# Patient Record
Sex: FEMALE | Race: WHITE | NOT HISPANIC OR LATINO | Employment: UNEMPLOYED | URBAN - METROPOLITAN AREA
[De-identification: names, ages, dates, MRNs, and addresses within clinical notes are randomized per-mention and may not be internally consistent; named-entity substitution may affect disease eponyms.]

---

## 2017-03-24 ENCOUNTER — HOSPITAL ENCOUNTER (EMERGENCY)
Facility: MEDICAL CENTER | Age: 42
End: 2017-03-24
Attending: EMERGENCY MEDICINE
Payer: COMMERCIAL

## 2017-03-24 VITALS
SYSTOLIC BLOOD PRESSURE: 138 MMHG | TEMPERATURE: 97 F | BODY MASS INDEX: 47.09 KG/M2 | DIASTOLIC BLOOD PRESSURE: 80 MMHG | WEIGHT: 293 LBS | HEART RATE: 64 BPM | HEIGHT: 66 IN | OXYGEN SATURATION: 96 % | RESPIRATION RATE: 18 BRPM

## 2017-03-24 DIAGNOSIS — M79.604 PAIN OF RIGHT LOWER EXTREMITY: ICD-10-CM

## 2017-03-24 LAB
ALBUMIN SERPL BCP-MCNC: 4.1 G/DL (ref 3.2–4.9)
ALBUMIN/GLOB SERPL: 1.4 G/DL
ALP SERPL-CCNC: 90 U/L (ref 30–99)
ALT SERPL-CCNC: 126 U/L (ref 2–50)
ANION GAP SERPL CALC-SCNC: 9 MMOL/L (ref 0–11.9)
APTT PPP: 28.4 SEC (ref 24.7–36)
AST SERPL-CCNC: 38 U/L (ref 12–45)
BASOPHILS # BLD AUTO: 0.7 % (ref 0–1.8)
BASOPHILS # BLD: 0.06 K/UL (ref 0–0.12)
BILIRUB SERPL-MCNC: 0.4 MG/DL (ref 0.1–1.5)
BUN SERPL-MCNC: 11 MG/DL (ref 8–22)
CALCIUM SERPL-MCNC: 9.3 MG/DL (ref 8.5–10.5)
CHLORIDE SERPL-SCNC: 104 MMOL/L (ref 96–112)
CO2 SERPL-SCNC: 23 MMOL/L (ref 20–33)
CREAT SERPL-MCNC: 0.73 MG/DL (ref 0.5–1.4)
EOSINOPHIL # BLD AUTO: 0.21 K/UL (ref 0–0.51)
EOSINOPHIL NFR BLD: 2.5 % (ref 0–6.9)
ERYTHROCYTE [DISTWIDTH] IN BLOOD BY AUTOMATED COUNT: 41.1 FL (ref 35.9–50)
ERYTHROCYTE [SEDIMENTATION RATE] IN BLOOD BY WESTERGREN METHOD: 24 MM/HOUR (ref 0–20)
GFR SERPL CREATININE-BSD FRML MDRD: >60 ML/MIN/1.73 M 2
GLOBULIN SER CALC-MCNC: 3 G/DL (ref 1.9–3.5)
GLUCOSE SERPL-MCNC: 91 MG/DL (ref 65–99)
HCT VFR BLD AUTO: 41.1 % (ref 37–47)
HGB BLD-MCNC: 13.7 G/DL (ref 12–16)
IMM GRANULOCYTES # BLD AUTO: 0.03 K/UL (ref 0–0.11)
IMM GRANULOCYTES NFR BLD AUTO: 0.4 % (ref 0–0.9)
INR PPP: 0.99 (ref 0.87–1.13)
LYMPHOCYTES # BLD AUTO: 2.68 K/UL (ref 1–4.8)
LYMPHOCYTES NFR BLD: 32.5 % (ref 22–41)
MCH RBC QN AUTO: 29.1 PG (ref 27–33)
MCHC RBC AUTO-ENTMCNC: 33.3 G/DL (ref 33.6–35)
MCV RBC AUTO: 87.3 FL (ref 81.4–97.8)
MONOCYTES # BLD AUTO: 0.52 K/UL (ref 0–0.85)
MONOCYTES NFR BLD AUTO: 6.3 % (ref 0–13.4)
NEUTROPHILS # BLD AUTO: 4.75 K/UL (ref 2–7.15)
NEUTROPHILS NFR BLD: 57.6 % (ref 44–72)
NRBC # BLD AUTO: 0 K/UL
NRBC BLD AUTO-RTO: 0 /100 WBC
PLATELET # BLD AUTO: 276 K/UL (ref 164–446)
PMV BLD AUTO: 10.4 FL (ref 9–12.9)
POTASSIUM SERPL-SCNC: 3.8 MMOL/L (ref 3.6–5.5)
PROT SERPL-MCNC: 7.1 G/DL (ref 6–8.2)
PROTHROMBIN TIME: 13.4 SEC (ref 12–14.6)
RBC # BLD AUTO: 4.71 M/UL (ref 4.2–5.4)
SODIUM SERPL-SCNC: 136 MMOL/L (ref 135–145)
WBC # BLD AUTO: 8.3 K/UL (ref 4.8–10.8)

## 2017-03-24 PROCEDURE — 85610 PROTHROMBIN TIME: CPT

## 2017-03-24 PROCEDURE — 99283 EMERGENCY DEPT VISIT LOW MDM: CPT

## 2017-03-24 PROCEDURE — 80053 COMPREHEN METABOLIC PANEL: CPT

## 2017-03-24 PROCEDURE — 93971 EXTREMITY STUDY: CPT

## 2017-03-24 PROCEDURE — 85025 COMPLETE CBC W/AUTO DIFF WBC: CPT

## 2017-03-24 PROCEDURE — 85652 RBC SED RATE AUTOMATED: CPT

## 2017-03-24 PROCEDURE — 85730 THROMBOPLASTIN TIME PARTIAL: CPT

## 2017-03-24 ASSESSMENT — PAIN SCALES - GENERAL: PAINLEVEL_OUTOF10: 7

## 2017-03-24 NOTE — ED AVS SNAPSHOT
Home Care Instructions                                                                                                                Jose Moreira   MRN: [id number]    Department:  [facility], Emergency Dept   Date of Visit:  3/24/2017            [facility], Emergency Dept    [address]    [place] [address]    Phone:  [phone]      You were seen by     Andrew Mckeon M.D.      Your Diagnosis Was     Pain of right lower extremity     M79.604       Follow-up Information     1. Follow up with JACQUELINE Borjas In 2 weeks.    Specialty:  Family Medicine    Why:  for repeat ultrasound if symptoms continue    Contact information    [address] [facility] [address]  [place] [address]  [phone]        Medication Information     Review all of your home medications and newly ordered medications with your primary doctor and/or pharmacist as soon as possible. Follow medication instructions as directed by your doctor and/or pharmacist.     Please keep your complete medication list with you and share with your physician. Update the information when medications are discontinued, doses are changed, or new medications (including over-the-counter products) are added; and carry medication information at all times in the event of emergency situations.               Medication List      ASK your doctor about these medications        Instructions    Morning Afternoon Evening Bedtime    carisoprodol 350 MG Tabs   Commonly known as:  SOMA        Take 1 Tab by mouth every 8 hours.   Dose:  350 mg                        gabapentin 300 MG Caps   Commonly known as:  NEURONTIN        Take 300 mg by mouth 3 times a day as needed.   Dose:  300 mg                        levothyroxine 25 MCG Tabs   Commonly known as:  SYNTHROID        Take 1 Tab by mouth Every morning on an empty stomach.   Dose:  25 mcg                        methocarbamol 500 MG Tabs   Commonly known as:  ROBAXIN        Take 500 mg  by mouth 3 times a day.   Dose:  500 mg                        tramadol 50 MG Tabs   Commonly known as:  ULTRAM        Take 50 mg by mouth 1 time daily as needed for Moderate Pain.   Dose:  50 mg                                Procedures and tests performed during your visit     APTT    CBC WITH DIFFERENTIAL    COMP METABOLIC PANEL    ESTIMATED GFR    LE VENOUS DUPLEX    PROTHROMBIN TIME    WESTERGREN SED RATE        Discharge Instructions       Musculoskeletal Pain  Musculoskeletal pain is muscle and nora aches and pains. These pains can occur in any part of the body. Your caregiver may treat you without knowing the cause of the pain. They may treat you if blood or urine tests, X-rays, and other tests were normal.   CAUSES  There is often not a definite cause or reason for these pains. These pains may be caused by a type of germ (virus). The discomfort may also come from overuse. Overuse includes working out too hard when your body is not fit. Nora aches also come from weather changes. Bone is sensitive to atmospheric pressure changes.  HOME CARE INSTRUCTIONS   · Ask when your test results will be ready. Make sure you get your test results.  · Only take over-the-counter or prescription medicines for pain, discomfort, or fever as directed by your caregiver. If you were given medications for your condition, do not drive, operate machinery or power tools, or sign legal documents for 24 hours. Do not drink alcohol. Do not take sleeping pills or other medications that may interfere with treatment.  · Continue all activities unless the activities cause more pain. When the pain lessens, slowly resume normal activities. Gradually increase the intensity and duration of the activities or exercise.  · During periods of severe pain, bed rest may be helpful. Lay or sit in any position that is comfortable.  · Putting ice on the injured area.  ¨ Put ice in a bag.  ¨ Place a towel between your skin and the bag.  ¨ Leave the ice  on for 15 to 20 minutes, 3 to 4 times a day.  · Follow up with your caregiver for continued problems and no reason can be found for the pain. If the pain becomes worse or does not go away, it may be necessary to repeat tests or do additional testing. Your caregiver may need to look further for a possible cause.  SEEK IMMEDIATE MEDICAL CARE IF:  · You have pain that is getting worse and is not relieved by medications.  · You develop chest pain that is associated with shortness or breath, sweating, feeling sick to your stomach (nauseous), or throw up (vomit).  · Your pain becomes localized to the abdomen.  · You develop any new symptoms that seem different or that concern you.  MAKE SURE YOU:   · Understand these instructions.  · Will watch your condition.  · Will get help right away if you are not doing well or get worse.     This information is not intended to replace advice given to you by your health care provider. Make sure you discuss any questions you have with your health care provider.     Document Released: 12/18/2006 Document Revised: 03/11/2013 Document Reviewed: 08/22/2014  [employer] Interactive Patient Education ©2016 [employer]            Patient Information     Patient Information    Following emergency treatment: all patient requiring follow-up care must return either to a private physician or a clinic if your condition worsens before you are able to obtain further medical attention, please return to the emergency room.     Billing Information    At [facility], we work to make the billing process streamlined for our patients.  Our Representatives are here to answer any questions you may have regarding your hospital bill.  If you have insurance coverage and have supplied your insurance information to us, we will submit a claim to your insurer on your behalf.  Should you have any questions regarding your bill, we can be reached online or by phone as follows:  Online: You are able pay your bills  online or live chat with our representatives about any billing questions you may have. We are here to help Monday - Friday from 8:00am to 7:30pm and 9:00am - 12:00pm on Saturdays.  Please visit [email or web address]  for more information.   Phone:  [phone] or [phone]    Please note that your emergency physician, surgeon, pathologist, radiologist, anesthesiologist, and other specialists are not employed by [facility] and will therefore bill separately for their services.  Please contact them directly for any questions concerning their bills at the numbers below:     Emergency Physician Services:  [phone]  [facility]:  [phone]  [facility]:  [phone]  [facility]:  [phone]    1. Your final bill may vary from the amount quoted upon discharge if all procedures are not complete at that time, or if your doctor has additional procedures of which we are not aware. You will receive an additional bill if you return to the Emergency Department at [facility] for suture removal regardless of the facility of which the sutures were placed.     2. Please arrange for settlement of this account at the emergency registration.    3. All self-pay accounts are due in full at the time of treatment.  If you are unable to meet this obligation then payment is expected within 4-5 days.     4. If you have had radiology studies (CT, X-ray, Ultrasound, MRI), you have received a preliminary result during your emergency department visit. Please contact the radiology department [phone] to receive a copy of your final result. Please discuss the Final result with your primary physician or with the follow up physician provided.     Crisis Hotline:  National Crisis Hotline:  6-962-IZARPJW or 1-466.582.9790  [place] Crisis Hotline:    1-179.770.6687 or 172-244-8826         ED Discharge Follow Up Questions    1. In order to provide you  with very good care, we would like to follow up with a phone call in the next few days.  May we have your permission to contact you?     YES /  NO    2. What is the best phone number to call you? (       )_____-__________    3. What is the best time to call you?      Morning  /  Afternoon  /  Evening                   Patient Signature:  ____________________________________________________________    Date:  ____________________________________________________________

## 2017-03-24 NOTE — ED AVS SNAPSHOT
[employer] Access Code: [email or web address]  Expires: 4/3/2017 11:06 AM    K[employer]  A secure, online tool to manage your health information     [employer]’s Kratos Technology® is a secure, online tool that connects you to your personalized health information from the privacy of your home -- day or night - making it very easy for you to manage your healthcare. Once the activation process is completed, you can even access your medical information using the K[employer] unique, which is available for free in the Apple Unique store or Google Play store.     [employer] provides the following levels of access (as shown below):   My Chart Features   [facility] Primary Care Doctor [facility]  Specialists [facility]  Urgent  Care Non-[facility]  Primary Care  Doctor   Email your healthcare team securely and privately 24/7 X X X X   Manage appointments: schedule your next appointment; view details of past/upcoming appointments X      Request prescription refills. X      View recent personal medical records, including lab and immunizations X X X X   View health record, including health history, allergies, medications X X X X   Read reports about your outpatient visits, procedures, consult and ER notes X X X X   See your discharge summary, which is a recap of your hospital and/or ER visit that includes your diagnosis, lab results, and care plan. X X       How to register for [employer]:  1. Go to  [email or web address].  2. Click on the Sign Up Now box, which takes you to the New Member Sign Up page. You will need to provide the following information:  a. Enter your [employer] Access Code exactly as it appears at the top of this page. (You will not need to use this code after you’ve completed the sign-up process. If you do not sign up before the expiration date, you must request a new code.)   b. Enter your date of birth.   c. Enter your home email address.   d. Click Submit, and follow the next screen’s instructions.  3. Create a K[employer] ID. This will be your [employer]  login ID and cannot be changed, so think of one that is secure and easy to remember.  4. Create a [email or web address] password. You can change your password at any time.  5. Enter your Password Reset Question and Answer. This can be used at a later time if you forget your password.   6. Enter your e-mail address. This allows you to receive e-mail notifications when new information is available in [email or web address].  7. Click Sign Up. You can now view your health information.    For assistance activating your [email or web address] account, call [phone]

## 2017-03-24 NOTE — ED NOTES
".  Chief Complaint   Patient presents with   • Leg Pain     Worsening right leg pain, denies swelling, denies redness, \"I think I might have a blood clot in the back of my knee\"     ./80 mmHg  Pulse 69  Temp(Src) 36.1 °C (97 °F) (Temporal)  Resp 18  Ht 1.676 m (5' 6\")  Wt 147.5 kg (325 lb 2.9 oz)  BMI 52.51 kg/m2  SpO2 98%    Ambulatory to triage with above complaints, states she was here last August with blod clot, no swelling or redness noted to leg, patient denies taking blood thinners  "

## 2017-03-24 NOTE — ED AVS SNAPSHOT
3/24/2017          Jose Moreira  [address]  [facility] [address]    Dear Jose:    [facility] wants to ensure your discharge home is safe and you or your loved ones have had all your questions answered regarding your care after you leave the hospital.    You may receive a telephone call within two days of your discharge.  This call is to make certain you understand your discharge instructions as well as ensure we provided you with the best care possible during your stay with us.     The call will only last approximately 3-5 minutes and will be done by a nurse.    Once again, we want to ensure your discharge home is safe and that you have a clear understanding of any next steps in your care.  If you have any questions or concerns, please do not hesitate to contact us, we are here for you.  Thank you for choosing [facility] for your healthcare needs.    Sincerely,    Ozzie Ralph    [facility]

## 2017-03-25 NOTE — ED PROVIDER NOTES
"ED Provider Note    CHIEF COMPLAINT  Chief Complaint   Patient presents with   • Leg Pain     Worsening right leg pain, denies swelling, denies redness, \"I think I might have a blood clot in the back of my knee\"; pain on and off x 7 months, worsening       HPI  Jose Moreira is a 41 y.o. female who presents for evaluation of pain in the posterior right calf, mild swelling. The patient denies any recent trauma. She does not report pain inside the knee and denies any hyperextension twisting or direct knee injury. She specifically denies any chest pain or shortness of breath. No pleuritic discomfort. She is worried that she might have a blood clot. He does have an extensive family history of blood clots in her mother by report. She's never had a blood clot herself before. Denies any other symptoms. No high fevers or chills    REVIEW OF SYSTEMS  See HPI for further details. No numbness weakness or tingling All other systems are negative.     PAST MEDICAL HISTORY  Past Medical History   Diagnosis Date   • Muscle disorder      c1, c2, nerve pain       FAMILY HISTORY  Noncontributory    SOCIAL HISTORY  Social History     Social History   • Marital Status:      Spouse Name: N/A   • Number of Children: N/A   • Years of Education: N/A     Social History Main Topics   • Smoking status: Former Smoker -- 17 years     Types: Cigarettes     Quit date: 07/27/2012   • Smokeless tobacco: None   • Alcohol Use: 0.6 oz/week     1 Cans of beer per week      Comment: RARE   • Drug Use: No   • Sexual Activity: Not Asked     Other Topics Concern   • None     Social History Narrative     Former smoker no IV drugs denies pregnancy  SURGICAL HISTORY  History reviewed. No pertinent past surgical history.  Gallbladder  CURRENT MEDICATIONS  No current facility-administered medications for this encounter.    Current outpatient prescriptions:   •  carisoprodol (SOMA) 350 MG Tab, Take 1 Tab by mouth every 8 hours., Disp: 30 Tab, Rfl: 0  •  " "levothyroxine (SYNTHROID) 25 MCG Tab, Take 1 Tab by mouth Every morning on an empty stomach., Disp: 30 Tab, Rfl: 1  •  tramadol (ULTRAM) 50 MG Tab, Take 50 mg by mouth 1 time daily as needed for Moderate Pain., Disp: , Rfl:   •  gabapentin (NEURONTIN) 300 MG Cap, Take 300 mg by mouth 3 times a day as needed., Disp: , Rfl:   •  methocarbamol (ROBAXIN) 500 MG Tab, Take 500 mg by mouth 3 times a day., Disp: , Rfl:       ALLERGIES  Allergies   Allergen Reactions   • Morphine Itching       PHYSICAL EXAM  VITAL SIGNS: /80 mmHg  Pulse 64  Temp(Src) 36.1 °C (97 °F) (Temporal)  Resp 18  Ht 1.676 m (5' 6\")  Wt 147.5 kg (325 lb 2.9 oz)  BMI 52.51 kg/m2  SpO2 98%  LMP 03/20/2017 (Exact Date)      Constitutional: Well developed, Well nourished, No acute distress, Non-toxic appearance.   HENT: Normocephalic, Atraumatic, Bilateral external ears normal, Oropharynx moist, No oral exudates, Nose normal.   Eyes: PERRLA, EOMI, Conjunctiva normal, No discharge.   Neck: Normal range of motion, No tenderness, Supple, No stridor.   Cardiovascular: Normal heart rate, Normal rhythm, No murmurs, No rubs, No gallops.   Thorax & Lungs: Normal breath sounds, No respiratory distress, No wheezing, No chest tenderness.   Abdomen: Bowel sounds normal, Soft, No tenderness, No masses, No pulsatile masses.   Skin: Warm, Dry, No erythema, No rash.   Extremities: Swelling noted in the right lower extremity positive Homans sign distal neurovascular exam is normal no erythema no warmth noted joint effusion no bony tenderness   Neurologic: Alert & oriented x 3, Normal motor function, Normal sensory function, No focal deficits noted.   Psychiatric: Affect normal, Judgment normal, Mood normal.       RADIOLOGY/PROCEDURES  LE VENOUS DUPLEX   Final Result       ultrasound of the right lower extremity is negative for DVT    Results for orders placed or performed during the hospital encounter of 03/24/17   CBC WITH DIFFERENTIAL   Result Value Ref " Range    WBC 8.3 4.8 - 10.8 K/uL    RBC 4.71 4.20 - 5.40 M/uL    Hemoglobin 13.7 12.0 - 16.0 g/dL    Hematocrit 41.1 37.0 - 47.0 %    MCV 87.3 81.4 - 97.8 fL    MCH 29.1 27.0 - 33.0 pg    MCHC 33.3 (L) 33.6 - 35.0 g/dL    RDW 41.1 35.9 - 50.0 fL    Platelet Count 276 164 - 446 K/uL    MPV 10.4 9.0 - 12.9 fL    Neutrophils-Polys 57.60 44.00 - 72.00 %    Lymphocytes 32.50 22.00 - 41.00 %    Monocytes 6.30 0.00 - 13.40 %    Eosinophils 2.50 0.00 - 6.90 %    Basophils 0.70 0.00 - 1.80 %    Immature Granulocytes 0.40 0.00 - 0.90 %    Nucleated RBC 0.00 /100 WBC    Neutrophils (Absolute) 4.75 2.00 - 7.15 K/uL    Lymphs (Absolute) 2.68 1.00 - 4.80 K/uL    Monos (Absolute) 0.52 0.00 - 0.85 K/uL    Eos (Absolute) 0.21 0.00 - 0.51 K/uL    Baso (Absolute) 0.06 0.00 - 0.12 K/uL    Immature Granulocytes (abs) 0.03 0.00 - 0.11 K/uL    NRBC (Absolute) 0.00 K/uL   COMP METABOLIC PANEL   Result Value Ref Range    Sodium 136 135 - 145 mmol/L    Potassium 3.8 3.6 - 5.5 mmol/L    Chloride 104 96 - 112 mmol/L    Co2 23 20 - 33 mmol/L    Anion Gap 9.0 0.0 - 11.9    Glucose 91 65 - 99 mg/dL    Bun 11 8 - 22 mg/dL    Creatinine 0.73 0.50 - 1.40 mg/dL    Calcium 9.3 8.5 - 10.5 mg/dL    AST(SGOT) 38 12 - 45 U/L    ALT(SGPT) 126 (H) 2 - 50 U/L    Alkaline Phosphatase 90 30 - 99 U/L    Total Bilirubin 0.4 0.1 - 1.5 mg/dL    Albumin 4.1 3.2 - 4.9 g/dL    Total Protein 7.1 6.0 - 8.2 g/dL    Globulin 3.0 1.9 - 3.5 g/dL    A-G Ratio 1.4 g/dL   PROTHROMBIN TIME   Result Value Ref Range    PT 13.4 12.0 - 14.6 sec    INR 0.99 0.87 - 1.13   APTT   Result Value Ref Range    APTT 28.4 24.7 - 36.0 sec   ESTIMATED GFR   Result Value Ref Range    GFR If African American >60 >60 mL/min/1.73 m 2    GFR If Non African American >60 >60 mL/min/1.73 m 2      COURSE & MEDICAL DECISION MAKING  Pertinent Labs & Imaging studies reviewed. (See chart for details)  The patient has no evidence of deep vein thrombosis in left lites are normal. The patient did not want to  wait for the sed rate which I think is reasonable. I recommended that she rest ice and elevate her right leg. If she develops persistent pain and swelling she will need a repeat ultrasound in 10-14 days    FINAL IMPRESSION  1. Right lower extremity pain         Electronically signed by: Andrew Mckeon, 3/24/2017 6:08 PM

## 2017-03-25 NOTE — ED NOTES
"Pt placed in gown, VS obtained  Chief Complaint   Patient presents with   • Leg Pain     Worsening right leg pain, denies swelling, denies redness, \"I think I might have a blood clot in the back of my knee\"; pain on and off x 7 months, worsening       "

## 2017-03-25 NOTE — DISCHARGE INSTRUCTIONS
Musculoskeletal Pain  Musculoskeletal pain is muscle and nora aches and pains. These pains can occur in any part of the body. Your caregiver may treat you without knowing the cause of the pain. They may treat you if blood or urine tests, X-rays, and other tests were normal.   CAUSES  There is often not a definite cause or reason for these pains. These pains may be caused by a type of germ (virus). The discomfort may also come from overuse. Overuse includes working out too hard when your body is not fit. Nora aches also come from weather changes. Bone is sensitive to atmospheric pressure changes.  HOME CARE INSTRUCTIONS   · Ask when your test results will be ready. Make sure you get your test results.  · Only take over-the-counter or prescription medicines for pain, discomfort, or fever as directed by your caregiver. If you were given medications for your condition, do not drive, operate machinery or power tools, or sign legal documents for 24 hours. Do not drink alcohol. Do not take sleeping pills or other medications that may interfere with treatment.  · Continue all activities unless the activities cause more pain. When the pain lessens, slowly resume normal activities. Gradually increase the intensity and duration of the activities or exercise.  · During periods of severe pain, bed rest may be helpful. Lay or sit in any position that is comfortable.  · Putting ice on the injured area.  ¨ Put ice in a bag.  ¨ Place a towel between your skin and the bag.  ¨ Leave the ice on for 15 to 20 minutes, 3 to 4 times a day.  · Follow up with your caregiver for continued problems and no reason can be found for the pain. If the pain becomes worse or does not go away, it may be necessary to repeat tests or do additional testing. Your caregiver may need to look further for a possible cause.  SEEK IMMEDIATE MEDICAL CARE IF:  · You have pain that is getting worse and is not relieved by medications.  · You develop chest pain  that is associated with shortness or breath, sweating, feeling sick to your stomach (nauseous), or throw up (vomit).  · Your pain becomes localized to the abdomen.  · You develop any new symptoms that seem different or that concern you.  MAKE SURE YOU:   · Understand these instructions.  · Will watch your condition.  · Will get help right away if you are not doing well or get worse.     This information is not intended to replace advice given to you by your health care provider. Make sure you discuss any questions you have with your health care provider.     Document Released: 12/18/2006 Document Revised: 03/11/2013 Document Reviewed: 08/22/2014  [employer] Interactive Patient Education ©2016 [employer]

## 2017-03-25 NOTE — ED NOTES
Waiting for labs results to further determine POC; no distress noted;  at bedside, bed low, call light within reach

## 2018-12-03 ENCOUNTER — TELEPHONE (OUTPATIENT)
Dept: SCHEDULING | Facility: IMAGING CENTER | Age: 43
End: 2018-12-03

## 2020-02-15 ENCOUNTER — TELEPHONE (OUTPATIENT)
Dept: SCHEDULING | Facility: IMAGING CENTER | Age: 45
End: 2020-02-15

## 2021-01-19 ENCOUNTER — HOSPITAL ENCOUNTER (EMERGENCY)
Facility: MEDICAL CENTER | Age: 46
End: 2021-01-19
Attending: EMERGENCY MEDICINE
Payer: COMMERCIAL

## 2021-01-19 ENCOUNTER — APPOINTMENT (OUTPATIENT)
Dept: RADIOLOGY | Facility: MEDICAL CENTER | Age: 46
End: 2021-01-19
Attending: EMERGENCY MEDICINE
Payer: COMMERCIAL

## 2021-01-19 VITALS
SYSTOLIC BLOOD PRESSURE: 157 MMHG | BODY MASS INDEX: 46.61 KG/M2 | RESPIRATION RATE: 16 BRPM | HEIGHT: 66 IN | DIASTOLIC BLOOD PRESSURE: 74 MMHG | HEART RATE: 89 BPM | WEIGHT: 290 LBS | TEMPERATURE: 98.3 F | OXYGEN SATURATION: 98 %

## 2021-01-19 DIAGNOSIS — M25.561 ACUTE PAIN OF RIGHT KNEE: ICD-10-CM

## 2021-01-19 LAB
ALBUMIN SERPL BCP-MCNC: 4 G/DL (ref 3.2–4.9)
ALBUMIN/GLOB SERPL: 1.5 G/DL
ALP SERPL-CCNC: 152 U/L (ref 30–99)
ALT SERPL-CCNC: 414 U/L (ref 2–50)
ANION GAP SERPL CALC-SCNC: 9 MMOL/L (ref 7–16)
AST SERPL-CCNC: 605 U/L (ref 12–45)
BASOPHILS # BLD AUTO: 0.5 % (ref 0–1.8)
BASOPHILS # BLD: 0.04 K/UL (ref 0–0.12)
BILIRUB SERPL-MCNC: 0.3 MG/DL (ref 0.1–1.5)
BUN SERPL-MCNC: 15 MG/DL (ref 8–22)
CALCIUM SERPL-MCNC: 9.2 MG/DL (ref 8.5–10.5)
CHLORIDE SERPL-SCNC: 107 MMOL/L (ref 96–112)
CO2 SERPL-SCNC: 24 MMOL/L (ref 20–33)
CREAT SERPL-MCNC: 0.71 MG/DL (ref 0.5–1.4)
EOSINOPHIL # BLD AUTO: 0.11 K/UL (ref 0–0.51)
EOSINOPHIL NFR BLD: 1.5 % (ref 0–6.9)
ERYTHROCYTE [DISTWIDTH] IN BLOOD BY AUTOMATED COUNT: 41.7 FL (ref 35.9–50)
GLOBULIN SER CALC-MCNC: 2.7 G/DL (ref 1.9–3.5)
GLUCOSE SERPL-MCNC: 108 MG/DL (ref 65–99)
HCT VFR BLD AUTO: 40.1 % (ref 37–47)
HGB BLD-MCNC: 12.9 G/DL (ref 12–16)
IMM GRANULOCYTES # BLD AUTO: 0.02 K/UL (ref 0–0.11)
IMM GRANULOCYTES NFR BLD AUTO: 0.3 % (ref 0–0.9)
LYMPHOCYTES # BLD AUTO: 2.14 K/UL (ref 1–4.8)
LYMPHOCYTES NFR BLD: 29.3 % (ref 22–41)
MCH RBC QN AUTO: 28 PG (ref 27–33)
MCHC RBC AUTO-ENTMCNC: 32.2 G/DL (ref 33.6–35)
MCV RBC AUTO: 87.2 FL (ref 81.4–97.8)
MONOCYTES # BLD AUTO: 0.69 K/UL (ref 0–0.85)
MONOCYTES NFR BLD AUTO: 9.4 % (ref 0–13.4)
NEUTROPHILS # BLD AUTO: 4.31 K/UL (ref 2–7.15)
NEUTROPHILS NFR BLD: 59 % (ref 44–72)
NRBC # BLD AUTO: 0 K/UL
NRBC BLD-RTO: 0 /100 WBC
PLATELET # BLD AUTO: 249 K/UL (ref 164–446)
PMV BLD AUTO: 10.9 FL (ref 9–12.9)
POTASSIUM SERPL-SCNC: 4 MMOL/L (ref 3.6–5.5)
PROT SERPL-MCNC: 6.7 G/DL (ref 6–8.2)
RBC # BLD AUTO: 4.6 M/UL (ref 4.2–5.4)
SODIUM SERPL-SCNC: 140 MMOL/L (ref 135–145)
WBC # BLD AUTO: 7.3 K/UL (ref 4.8–10.8)

## 2021-01-19 PROCEDURE — 96374 THER/PROPH/DIAG INJ IV PUSH: CPT | Mod: EDC

## 2021-01-19 PROCEDURE — 99284 EMERGENCY DEPT VISIT MOD MDM: CPT | Mod: EDC

## 2021-01-19 PROCEDURE — 700111 HCHG RX REV CODE 636 W/ 250 OVERRIDE (IP): Mod: EDC | Performed by: EMERGENCY MEDICINE

## 2021-01-19 PROCEDURE — 700117 HCHG RX CONTRAST REV CODE 255: Mod: EDC | Performed by: EMERGENCY MEDICINE

## 2021-01-19 PROCEDURE — 73706 CT ANGIO LWR EXTR W/O&W/DYE: CPT | Mod: RT

## 2021-01-19 PROCEDURE — 96375 TX/PRO/DX INJ NEW DRUG ADDON: CPT | Mod: EDC

## 2021-01-19 PROCEDURE — 85025 COMPLETE CBC W/AUTO DIFF WBC: CPT | Mod: EDC

## 2021-01-19 PROCEDURE — 80053 COMPREHEN METABOLIC PANEL: CPT | Mod: EDC

## 2021-01-19 PROCEDURE — A9270 NON-COVERED ITEM OR SERVICE: HCPCS | Mod: EDC | Performed by: EMERGENCY MEDICINE

## 2021-01-19 PROCEDURE — 700102 HCHG RX REV CODE 250 W/ 637 OVERRIDE(OP): Mod: EDC | Performed by: EMERGENCY MEDICINE

## 2021-01-19 RX ORDER — HYDROMORPHONE HYDROCHLORIDE 1 MG/ML
1 INJECTION, SOLUTION INTRAMUSCULAR; INTRAVENOUS; SUBCUTANEOUS
Status: DISCONTINUED | OUTPATIENT
Start: 2021-01-19 | End: 2021-01-19 | Stop reason: HOSPADM

## 2021-01-19 RX ORDER — ONDANSETRON 2 MG/ML
4 INJECTION INTRAMUSCULAR; INTRAVENOUS ONCE
Status: COMPLETED | OUTPATIENT
Start: 2021-01-19 | End: 2021-01-19

## 2021-01-19 RX ORDER — OXYCODONE HYDROCHLORIDE 5 MG/1
5 TABLET ORAL EVERY 4 HOURS PRN
Qty: 10 TAB | Refills: 0 | Status: SHIPPED | OUTPATIENT
Start: 2021-01-19 | End: 2021-01-22

## 2021-01-19 RX ORDER — OXYCODONE HYDROCHLORIDE 5 MG/1
5 TABLET ORAL ONCE
Status: COMPLETED | OUTPATIENT
Start: 2021-01-19 | End: 2021-01-19

## 2021-01-19 RX ADMIN — HYDROMORPHONE HYDROCHLORIDE 1 MG: 1 INJECTION, SOLUTION INTRAMUSCULAR; INTRAVENOUS; SUBCUTANEOUS at 19:27

## 2021-01-19 RX ADMIN — IOHEXOL 100 ML: 350 INJECTION, SOLUTION INTRAVENOUS at 20:13

## 2021-01-19 RX ADMIN — ONDANSETRON 4 MG: 2 INJECTION INTRAMUSCULAR; INTRAVENOUS at 19:27

## 2021-01-19 RX ADMIN — OXYCODONE 5 MG: 5 TABLET ORAL at 21:09

## 2021-01-20 NOTE — ED NOTES
"Jose Galvan has been discharged from the Emergency Room.    Discharge instructions, which include signs and symptoms to monitor at home for, as well as detailed information regarding knee injury provided.  All questions and concerns addressed at this time.      Prescription for Oxycodone sent to preferred pharmacy .  Controlled Substance Use Informed Consent signed by patient and placed in chart.  Patient educated to not combine this medication with additional acetaminophen at home, as prescribed medication already contains it as an active ingredient.    Patient encouraged to follow up with Ortho, Dr. Grady's office contact information with phone number and address provided.      Patient leaves ER in no apparent distress. This RN provided education regarding returning to the ER for any new concerns or changes in patient's condition.      /74   Pulse 89   Temp 36.8 °C (98.3 °F) (Temporal)   Resp 16   Ht 1.676 m (5' 6\")   Wt (!) 131.5 kg (290 lb)   SpO2 98%   BMI 46.81 kg/m²     "

## 2021-01-20 NOTE — ED TRIAGE NOTES
"Chief Complaint   Patient presents with   • Knee Pain     Pt hurt her right knee today at work. Pt was working with cattle and patient \"jumped and spun\" and her knee gave out. Pt was immediately in \"excrutiating\" pain. Patient states she is unable to walk and the knee is very swollen.      BP (!) 166/97   Pulse 75   Temp 36 °C (96.8 °F) (Temporal)   Resp 16   Ht 1.676 m (5' 6\")   Wt (!) 131.5 kg (290 lb)   SpO2 98%   BMI 46.81 kg/m²     Patient arrived to ED for the above complaint. Patient in WC. Patient placed in lobby and told to notify RN of any changes.   "

## 2021-01-20 NOTE — ED PROVIDER NOTES
"ED Provider Note    Scribed for Ke Manuel M.D. by Eleno Paiz. 1/19/2021  7:06 PM    Primary care provider: None noted  Means of arrival: Walk in  History obtained from: Patient  History limited by: None    CHIEF COMPLAINT  Chief Complaint   Patient presents with   • Knee Pain     Pt hurt her right knee today at work. Pt was working with cattle and patient \"jumped and spun\" and her knee gave out. Pt was immediately in \"excrutiating\" pain. Patient states she is unable to walk and the knee is very swollen.      HPI  Jose Galvan is a 45 y.o. female who presents to the Emergency Department for evaluation of right knee pain onset prior to arrival. 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 She admits to additional symptoms of inability to walk secondary to knee pain, but denies any other injury, numbness or tingling, chest pain, abdominal pain, nausea, or vomiting. She denies alleviating factors.      PPE Note: I personally donned PPE for all patient encounters during this visit, including being clean-shaven with a KN95 mask, gloves, and eye protection.     Scribe remained outside the patient's room and did not have any contact with the patient for the duration of patient encounter.       REVIEW OF SYSTEMS  Pertinent positives include right knee pain,  inability to walk secondary to knee pain.   Pertinent negatives include no other injury, numbness or tingling, chest pain, abdominal pain, nausea, or vomiting.    All other systems reviewed and negative.    PAST MEDICAL HISTORY   None noted    SURGICAL HISTORY  patient denies any surgical history    SOCIAL HISTORY  Social History     Tobacco Use   • Smoking status: None noted   Substance Use Topics   • Alcohol use: None noted   • Drug use: None noted      FAMILY HISTORY  None noted    CURRENT MEDICATIONS  Home Medications     Reviewed by Yesica Briggs, " "OPHELIA (Registered Nurse) on 01/19/21 at 1839  Med List Status: Not Addressed   Medication Last Dose Status        Patient Asad Taking any Medications                     ALLERGIES  No Known Allergies    PHYSICAL EXAM  VITAL SIGNS: BP (!) 166/97   Pulse 75   Temp 36 °C (96.8 °F) (Temporal)   Resp 16   Ht 1.676 m (5' 6\")   Wt (!) 131.5 kg (290 lb)   SpO2 98%   BMI 46.81 kg/m²     Constitutional: Morbidly obese. Well developed, Well nourished, moderate distress, Non-toxic appearance.   HENT: Normocephalic, Atraumatic, Bilateral external ears normal, Oropharynx moist, No oral exudates.   Eyes: PERRLA, EOMI, Conjunctiva normal, No discharge.   Neck: No tenderness, Supple, No stridor.   Lymphatic: No lymphadenopathy noted.   Cardiovascular: Normal heart rate, Normal rhythm.   Thorax & Lungs: Clear to auscultation bilaterally, No respiratory distress, No wheezing, No crackles.   Abdomen: Morbidly obese. Soft, No tenderness, No masses, No pulsatile masses.   Skin: Warm, Dry, No erythema, No rash.   Extremities:Tenderness to palpation throughout the right knee, with moderate to large effusion, decreased range of motion, 2 + pulse distally.  No cyanosis.   Musculoskeletal: No major deformities noted.  Intact distal pulses  Neurologic: Awake, alert. Moves all extremities spontaneously.  Psychiatric: Affect normal, Judgment normal, Mood normal.     LABS  Results for orders placed or performed during the hospital encounter of 01/19/21   CBC WITH DIFFERENTIAL   Result Value Ref Range    WBC 7.3 4.8 - 10.8 K/uL    RBC 4.60 4.20 - 5.40 M/uL    Hemoglobin 12.9 12.0 - 16.0 g/dL    Hematocrit 40.1 37.0 - 47.0 %    MCV 87.2 81.4 - 97.8 fL    MCH 28.0 27.0 - 33.0 pg    MCHC 32.2 (L) 33.6 - 35.0 g/dL    RDW 41.7 35.9 - 50.0 fL    Platelet Count 249 164 - 446 K/uL    MPV 10.9 9.0 - 12.9 fL    Neutrophils-Polys 59.00 44.00 - 72.00 %    Lymphocytes 29.30 22.00 - 41.00 %    Monocytes 9.40 0.00 - 13.40 %    Eosinophils 1.50 0.00 - 6.90 " %    Basophils 0.50 0.00 - 1.80 %    Immature Granulocytes 0.30 0.00 - 0.90 %    Nucleated RBC 0.00 /100 WBC    Neutrophils (Absolute) 4.31 2.00 - 7.15 K/uL    Lymphs (Absolute) 2.14 1.00 - 4.80 K/uL    Monos (Absolute) 0.69 0.00 - 0.85 K/uL    Eos (Absolute) 0.11 0.00 - 0.51 K/uL    Baso (Absolute) 0.04 0.00 - 0.12 K/uL    Immature Granulocytes (abs) 0.02 0.00 - 0.11 K/uL    NRBC (Absolute) 0.00 K/uL   COMP METABOLIC PANEL   Result Value Ref Range    Sodium 140 135 - 145 mmol/L    Potassium 4.0 3.6 - 5.5 mmol/L    Chloride 107 96 - 112 mmol/L    Co2 24 20 - 33 mmol/L    Anion Gap 9.0 7.0 - 16.0    Glucose 108 (H) 65 - 99 mg/dL    Bun 15 8 - 22 mg/dL    Creatinine 0.71 0.50 - 1.40 mg/dL    Calcium 9.2 8.5 - 10.5 mg/dL    AST(SGOT) 605 (H) 12 - 45 U/L    ALT(SGPT) 414 (H) 2 - 50 U/L    Alkaline Phosphatase 152 (H) 30 - 99 U/L    Total Bilirubin 0.3 0.1 - 1.5 mg/dL    Albumin 4.0 3.2 - 4.9 g/dL    Total Protein 6.7 6.0 - 8.2 g/dL    Globulin 2.7 1.9 - 3.5 g/dL    A-G Ratio 1.5 g/dL   ESTIMATED GFR   Result Value Ref Range    GFR If African American >60 >60 mL/min/1.73 m 2    GFR If Non African American >60 >60 mL/min/1.73 m 2      All labs reviewed by me.     RADIOLOGY  CT-CTA LOWER EXT WITH & W/O-POST PROCESS RIGHT   Final Result         1.  Mildly enhancing knee joint effusion, consider septic joint as clinically appropriate.   2.  No visualized aneurysm, dissection, occlusion, or high-grade stenosis identified.   3.  Severe tricompartmental degenerative changes of the knee   4.  Corticated ossific densities in the joint space, could represent changes of synovial osteochondromatosis or loose bodies related to prior injury.   5.  Diverticulosis        The radiologist's interpretation of all radiological studies have been reviewed by me.    COURSE & MEDICAL DECISION MAKING  Pertinent Labs & Imaging studies reviewed. (See chart for details)    7:06 PM - Patient seen and examined at bedside. I informed the patient of my  plan to run diagnostic studies to evaluate their symptoms including imaging and labs. Patient verbalizes understanding and support with my plan of care.  Patient will be treated with Dilaudid 1 mg injection every 30 minutes PRN, Zofran 4 mg injection. Ordered CT-CTA Lower Extremity Right, CBC with diff, CMP to evaluate her symptoms.    8:48 PM - I reevaluated the patient at bedside. The patient informs me they feel improved following interventions. I discussed the patient's diagnostic study results was noted above. I discussed plan for discharge and follow up with Ortho as outlined below. She will have a knee immobilizer placed and will be given crutches prior to discharge. The patient verbalizes they feel comfortable going home. The patient is stable for discharge at this time and will return for any new or worsening symptoms. Patient verbalizes understanding and support with my plan for discharge.      Decision Making:  Patient is a morbidly obese female who twisted her knee with a large hemarthrosis on my evaluation therefore I got a CTA to make sure the patient did not have a posterior dislocation, the arteries appear to be intact, will put the patient in a knee immobilizer, crutches, referral to who Worker's Comp. and orthopedics, have the patient return with any concerns.    In prescribing controlled substances to this patient, I certify that I have obtained and reviewed the medical history of Jose Galvan. I have also made a good veronica effort to obtain applicable records from other providers who have treated the patient and records did not demonstrate any increased risk of substance abuse that would prevent me from prescribing controlled substances.     I have conducted a physical exam and documented it. I have reviewed Ms. Galvan’s prescription history as maintained by the [place] Prescription Monitoring Program.     I have assessed the patient’s risk for abuse, dependency, and addiction using the  validated Opioid Risk Tool available at https://www.mdcalc.com/jxkfms-fbhu-wqtt-ort-narcotic-abuse.     Given the above, I believe the benefits of controlled substance therapy outweigh the risks. The reasons for prescribing controlled substances include non-narcotic, oral analgesic alternatives have been inadequate for pain control. Accordingly, I have discussed the risk and benefits, treatment plan, and alternative therapies with the patient.         The patient will return for new or worsening symptoms and is stable at the time of discharge.    The patient is referred to a primary physician for blood pressure management, diabetic screening, and for all other preventative health concerns.    DISPOSITION:  Patient will be discharged home in stable condition.    FOLLOW UP:  [facility], Emergency Dept  [address] [address] [phone]    If symptoms worsen    Tracie Rowell  [address] [address]  [phone]          Alberto Grady M.D.  [address] [address]  [phone]          FINAL IMPRESSION  1. Acute pain of right knee        Eleno CONTRERAS (Scribe), am scribing for, and in the presence of, Ke Manuel M.D..    Electronically signed by: Eleno Paiz ([place]), 1/19/2021    Ke CONTRERAS M.D. personally performed the services described in this documentation, as scribed by Eleno Paiz in my presence, and it is both accurate and complete.    The note accurately reflects work and decisions made by me.  Ke Manuel M.D.  1/19/2021  10:39 PM

## 2021-01-20 NOTE — ED NOTES
Pt ambulatory to Peds 53. Agree with triage RN note. Instructed to change into gown. Pt awake and alert. Reports twisting motion today causing a pop and sudden pain to R knee. Pt states she has noticed it is swollen. CMS intact distally. Family at bedside. Call light within reach. Denies additional needs. Up for ERP eval.

## 2021-01-20 NOTE — ED NOTES
PIV established to patient's left forearm.  patient verified correct patient name and  on labeled specimen.  Blood collected and sent to lab.  This RN provided possible lab wait times.    IV is saline locked at this time.

## 2023-08-07 ENCOUNTER — HOSPITAL ENCOUNTER (OUTPATIENT)
Facility: MEDICAL CENTER | Age: 48
End: 2023-08-09
Attending: EMERGENCY MEDICINE | Admitting: STUDENT IN AN ORGANIZED HEALTH CARE EDUCATION/TRAINING PROGRAM
Payer: MEDICAID

## 2023-08-07 ENCOUNTER — OFFICE VISIT (OUTPATIENT)
Dept: URGENT CARE | Facility: CLINIC | Age: 48
End: 2023-08-07
Payer: MEDICAID

## 2023-08-07 ENCOUNTER — APPOINTMENT (OUTPATIENT)
Dept: RADIOLOGY | Facility: MEDICAL CENTER | Age: 48
End: 2023-08-07
Attending: EMERGENCY MEDICINE

## 2023-08-07 VITALS
WEIGHT: 209 LBS | HEART RATE: 60 BPM | BODY MASS INDEX: 31.67 KG/M2 | HEIGHT: 68 IN | DIASTOLIC BLOOD PRESSURE: 72 MMHG | SYSTOLIC BLOOD PRESSURE: 116 MMHG | RESPIRATION RATE: 20 BRPM | TEMPERATURE: 97.9 F | OXYGEN SATURATION: 100 %

## 2023-08-07 DIAGNOSIS — R00.1 SINUS BRADYCARDIA: ICD-10-CM

## 2023-08-07 DIAGNOSIS — R00.2 HEART PALPITATIONS: ICD-10-CM

## 2023-08-07 DIAGNOSIS — N92.1 MENORRHAGIA WITH IRREGULAR CYCLE: ICD-10-CM

## 2023-08-07 DIAGNOSIS — R53.83 FATIGUE, UNSPECIFIED TYPE: ICD-10-CM

## 2023-08-07 DIAGNOSIS — R00.1 BRADYCARDIA: ICD-10-CM

## 2023-08-07 DIAGNOSIS — R00.2 PALPITATIONS: ICD-10-CM

## 2023-08-07 DIAGNOSIS — N39.0 ACUTE URINARY TRACT INFECTION: ICD-10-CM

## 2023-08-07 DIAGNOSIS — D64.9 SYMPTOMATIC ANEMIA: ICD-10-CM

## 2023-08-07 DIAGNOSIS — R42 DIZZINESS: ICD-10-CM

## 2023-08-07 LAB
ACANTHOCYTES BLD QL SMEAR: NORMAL
ALBUMIN SERPL BCP-MCNC: 3.9 G/DL (ref 3.2–4.9)
ALBUMIN/GLOB SERPL: 1.6 G/DL
ALP SERPL-CCNC: 67 U/L (ref 30–99)
ALT SERPL-CCNC: 9 U/L (ref 2–50)
ANION GAP SERPL CALC-SCNC: 7 MMOL/L (ref 7–16)
ANISOCYTOSIS BLD QL SMEAR: ABNORMAL
APPEARANCE UR: CLEAR
AST SERPL-CCNC: 16 U/L (ref 12–45)
B-HCG SERPL-ACNC: <1 MIU/ML (ref 0–5)
BACTERIA #/AREA URNS HPF: ABNORMAL /HPF
BASOPHILS # BLD AUTO: 0.9 % (ref 0–1.8)
BASOPHILS # BLD: 0.05 K/UL (ref 0–0.12)
BILIRUB SERPL-MCNC: 0.2 MG/DL (ref 0.1–1.5)
BILIRUB UR QL STRIP.AUTO: NEGATIVE
BUN SERPL-MCNC: 15 MG/DL (ref 8–22)
CALCIUM ALBUM COR SERPL-MCNC: 8.6 MG/DL (ref 8.5–10.5)
CALCIUM SERPL-MCNC: 8.5 MG/DL (ref 8.5–10.5)
CHLORIDE SERPL-SCNC: 106 MMOL/L (ref 96–112)
CO2 SERPL-SCNC: 26 MMOL/L (ref 20–33)
COLOR UR: YELLOW
COMMENT 1642: NORMAL
CREAT SERPL-MCNC: 0.65 MG/DL (ref 0.5–1.4)
EKG IMPRESSION: NORMAL
EOSINOPHIL # BLD AUTO: 0.14 K/UL (ref 0–0.51)
EOSINOPHIL NFR BLD: 2.5 % (ref 0–6.9)
EPI CELLS #/AREA URNS HPF: ABNORMAL /HPF
ERYTHROCYTE [DISTWIDTH] IN BLOOD BY AUTOMATED COUNT: 43.8 FL (ref 35.9–50)
FLUAV RNA SPEC QL NAA+PROBE: NEGATIVE
FLUBV RNA SPEC QL NAA+PROBE: NEGATIVE
GFR SERPLBLD CREATININE-BSD FMLA CKD-EPI: 108 ML/MIN/1.73 M 2
GLOBULIN SER CALC-MCNC: 2.5 G/DL (ref 1.9–3.5)
GLUCOSE SERPL-MCNC: 86 MG/DL (ref 65–99)
GLUCOSE UR STRIP.AUTO-MCNC: NEGATIVE MG/DL
HCT VFR BLD AUTO: 31.8 % (ref 37–47)
HGB BLD-MCNC: 9.3 G/DL (ref 12–16)
HYALINE CASTS #/AREA URNS LPF: ABNORMAL /LPF
HYPOCHROMIA BLD QL SMEAR: ABNORMAL
IMM GRANULOCYTES # BLD AUTO: 0.01 K/UL (ref 0–0.11)
IMM GRANULOCYTES NFR BLD AUTO: 0.2 % (ref 0–0.9)
KETONES UR STRIP.AUTO-MCNC: ABNORMAL MG/DL
LEUKOCYTE ESTERASE UR QL STRIP.AUTO: ABNORMAL
LYMPHOCYTES # BLD AUTO: 2.16 K/UL (ref 1–4.8)
LYMPHOCYTES NFR BLD: 38.9 % (ref 22–41)
MAGNESIUM SERPL-MCNC: 1.9 MG/DL (ref 1.5–2.5)
MCH RBC QN AUTO: 21 PG (ref 27–33)
MCHC RBC AUTO-ENTMCNC: 29.2 G/DL (ref 32.2–35.5)
MCV RBC AUTO: 71.9 FL (ref 81.4–97.8)
MICRO URNS: ABNORMAL
MICROCYTES BLD QL SMEAR: ABNORMAL
MONOCYTES # BLD AUTO: 0.53 K/UL (ref 0–0.85)
MONOCYTES NFR BLD AUTO: 9.5 % (ref 0–13.4)
MORPHOLOGY BLD-IMP: NORMAL
MUCOUS THREADS #/AREA URNS HPF: ABNORMAL /HPF
NEUTROPHILS # BLD AUTO: 2.66 K/UL (ref 1.82–7.42)
NEUTROPHILS NFR BLD: 48 % (ref 44–72)
NITRITE UR QL STRIP.AUTO: NEGATIVE
NRBC # BLD AUTO: 0 K/UL
NRBC BLD-RTO: 0 /100 WBC (ref 0–0.2)
OVALOCYTES BLD QL SMEAR: NORMAL
PH UR STRIP.AUTO: 5.5 [PH] (ref 5–8)
PLATELET # BLD AUTO: 290 K/UL (ref 164–446)
PLATELET BLD QL SMEAR: NORMAL
PMV BLD AUTO: 11 FL (ref 9–12.9)
POIKILOCYTOSIS BLD QL SMEAR: NORMAL
POTASSIUM SERPL-SCNC: 4 MMOL/L (ref 3.6–5.5)
PROT SERPL-MCNC: 6.4 G/DL (ref 6–8.2)
PROT UR QL STRIP: NEGATIVE MG/DL
RBC # BLD AUTO: 4.42 M/UL (ref 4.2–5.4)
RBC # URNS HPF: ABNORMAL /HPF
RBC BLD AUTO: PRESENT
RBC UR QL AUTO: NEGATIVE
RSV RNA SPEC QL NAA+PROBE: NEGATIVE
SARS-COV-2 RNA RESP QL NAA+PROBE: NOTDETECTED
SODIUM SERPL-SCNC: 139 MMOL/L (ref 135–145)
SP GR UR STRIP.AUTO: 1.03
SPECIMEN SOURCE: NORMAL
T4 FREE SERPL-MCNC: 0.91 NG/DL (ref 0.93–1.7)
TROPONIN T SERPL-MCNC: <6 NG/L (ref 6–19)
TSH SERPL DL<=0.005 MIU/L-ACNC: 2.09 UIU/ML (ref 0.38–5.33)
UROBILINOGEN UR STRIP.AUTO-MCNC: 0.2 MG/DL
WBC # BLD AUTO: 5.6 K/UL (ref 4.8–10.8)
WBC #/AREA URNS HPF: ABNORMAL /HPF

## 2023-08-07 PROCEDURE — 700105 HCHG RX REV CODE 258: Performed by: EMERGENCY MEDICINE

## 2023-08-07 PROCEDURE — 3074F SYST BP LT 130 MM HG: CPT | Performed by: PHYSICIAN ASSISTANT

## 2023-08-07 PROCEDURE — 93005 ELECTROCARDIOGRAM TRACING: CPT | Performed by: EMERGENCY MEDICINE

## 2023-08-07 PROCEDURE — 700111 HCHG RX REV CODE 636 W/ 250 OVERRIDE (IP): Mod: JZ | Performed by: EMERGENCY MEDICINE

## 2023-08-07 PROCEDURE — 94760 N-INVAS EAR/PLS OXIMETRY 1: CPT

## 2023-08-07 PROCEDURE — 81001 URINALYSIS AUTO W/SCOPE: CPT

## 2023-08-07 PROCEDURE — 84702 CHORIONIC GONADOTROPIN TEST: CPT

## 2023-08-07 PROCEDURE — 83735 ASSAY OF MAGNESIUM: CPT

## 2023-08-07 PROCEDURE — 80053 COMPREHEN METABOLIC PANEL: CPT

## 2023-08-07 PROCEDURE — 84443 ASSAY THYROID STIM HORMONE: CPT

## 2023-08-07 PROCEDURE — 0241U HCHG SARS-COV-2 COVID-19 NFCT DS RESP RNA 4 TRGT MIC: CPT

## 2023-08-07 PROCEDURE — 87086 URINE CULTURE/COLONY COUNT: CPT

## 2023-08-07 PROCEDURE — 84484 ASSAY OF TROPONIN QUANT: CPT

## 2023-08-07 PROCEDURE — 71045 X-RAY EXAM CHEST 1 VIEW: CPT

## 2023-08-07 PROCEDURE — 99285 EMERGENCY DEPT VISIT HI MDM: CPT

## 2023-08-07 PROCEDURE — 84439 ASSAY OF FREE THYROXINE: CPT

## 2023-08-07 PROCEDURE — 770020 HCHG ROOM/CARE - TELE (206)

## 2023-08-07 PROCEDURE — 99222 1ST HOSP IP/OBS MODERATE 55: CPT | Performed by: STUDENT IN AN ORGANIZED HEALTH CARE EDUCATION/TRAINING PROGRAM

## 2023-08-07 PROCEDURE — 3078F DIAST BP <80 MM HG: CPT | Performed by: PHYSICIAN ASSISTANT

## 2023-08-07 PROCEDURE — C9803 HOPD COVID-19 SPEC COLLECT: HCPCS | Performed by: EMERGENCY MEDICINE

## 2023-08-07 PROCEDURE — 96375 TX/PRO/DX INJ NEW DRUG ADDON: CPT

## 2023-08-07 PROCEDURE — 99204 OFFICE O/P NEW MOD 45 MIN: CPT | Performed by: PHYSICIAN ASSISTANT

## 2023-08-07 PROCEDURE — 36415 COLL VENOUS BLD VENIPUNCTURE: CPT

## 2023-08-07 PROCEDURE — 85025 COMPLETE CBC W/AUTO DIFF WBC: CPT

## 2023-08-07 PROCEDURE — 93005 ELECTROCARDIOGRAM TRACING: CPT

## 2023-08-07 RX ORDER — SODIUM CHLORIDE 9 MG/ML
1000 INJECTION, SOLUTION INTRAVENOUS ONCE
Status: COMPLETED | OUTPATIENT
Start: 2023-08-07 | End: 2023-08-07

## 2023-08-07 RX ORDER — CHOLECALCIFEROL (VITAMIN D3) 125 MCG
5 CAPSULE ORAL NIGHTLY PRN
Status: DISCONTINUED | OUTPATIENT
Start: 2023-08-07 | End: 2023-08-09 | Stop reason: HOSPADM

## 2023-08-07 RX ORDER — CALCIUM CARBONATE 300MG(750)
20 TABLET,CHEWABLE ORAL NIGHTLY PRN
COMMUNITY

## 2023-08-07 RX ORDER — ONDANSETRON 2 MG/ML
4 INJECTION INTRAMUSCULAR; INTRAVENOUS ONCE
Status: COMPLETED | OUTPATIENT
Start: 2023-08-07 | End: 2023-08-07

## 2023-08-07 RX ORDER — CEFTRIAXONE 2 G/1
2000 INJECTION, POWDER, FOR SOLUTION INTRAMUSCULAR; INTRAVENOUS ONCE
Status: COMPLETED | OUTPATIENT
Start: 2023-08-07 | End: 2023-08-07

## 2023-08-07 RX ADMIN — ONDANSETRON 4 MG: 2 INJECTION INTRAMUSCULAR; INTRAVENOUS at 19:30

## 2023-08-07 RX ADMIN — SODIUM CHLORIDE 1000 ML: 9 INJECTION, SOLUTION INTRAVENOUS at 19:30

## 2023-08-07 RX ADMIN — CEFTRIAXONE SODIUM 2000 MG: 2 INJECTION, POWDER, FOR SOLUTION INTRAMUSCULAR; INTRAVENOUS at 21:26

## 2023-08-07 ASSESSMENT — ENCOUNTER SYMPTOMS
PALPITATIONS: 1
PND: 0
HEADACHES: 1
NUMBNESS: 0
FEVER: 0
DIARRHEA: 0
SHORTNESS OF BREATH: 0
IRREGULAR HEARTBEAT: 1
DIZZINESS: 1
COUGH: 0
VOMITING: 0
NAUSEA: 0
DIAPHORESIS: 0
CONSTIPATION: 1
NERVOUS/ANXIOUS: 0
NEAR-SYNCOPE: 0

## 2023-08-08 ENCOUNTER — APPOINTMENT (OUTPATIENT)
Dept: RADIOLOGY | Facility: MEDICAL CENTER | Age: 48
End: 2023-08-08
Attending: OBSTETRICS & GYNECOLOGY

## 2023-08-08 PROBLEM — E66.9 OBESITY (BMI 30-39.9): Status: ACTIVE | Noted: 2023-08-08

## 2023-08-08 PROBLEM — D62 ACUTE BLOOD LOSS ANEMIA: Status: ACTIVE | Noted: 2023-08-08

## 2023-08-08 PROBLEM — R00.1 BRADYCARDIA: Status: ACTIVE | Noted: 2023-08-08

## 2023-08-08 PROBLEM — N92.0 HEAVY MENSTRUAL BLEEDING: Status: ACTIVE | Noted: 2023-08-08

## 2023-08-08 LAB
ABO + RH BLD: NORMAL
ABO GROUP BLD: NORMAL
BLD GP AB SCN SERPL QL: NORMAL
FERRITIN SERPL-MCNC: 5.4 NG/ML (ref 10–291)
HCT VFR BLD AUTO: 29.1 % (ref 37–47)
HCT VFR BLD AUTO: 29.3 % (ref 37–47)
HCT VFR BLD AUTO: 30.6 % (ref 37–47)
HGB BLD-MCNC: 8.5 G/DL (ref 12–16)
HGB BLD-MCNC: 8.7 G/DL (ref 12–16)
HGB BLD-MCNC: 8.9 G/DL (ref 12–16)
HGB RETIC QN AUTO: 21.2 PG/CELL (ref 29–35)
IMM RETICS NFR: 14.3 % (ref 2.6–16.1)
IRON SATN MFR SERPL: 6 % (ref 15–55)
IRON SERPL-MCNC: 19 UG/DL (ref 40–170)
RETICS # AUTO: 0.04 M/UL (ref 0.04–0.12)
RETICS/RBC NFR: 1 % (ref 0.8–2.6)
RH BLD: NORMAL
T3FREE SERPL-MCNC: 2.36 PG/ML (ref 2–4.4)
TIBC SERPL-MCNC: 316 UG/DL (ref 250–450)
UIBC SERPL-MCNC: 297 UG/DL (ref 110–370)

## 2023-08-08 PROCEDURE — 76830 TRANSVAGINAL US NON-OB: CPT

## 2023-08-08 PROCEDURE — 96365 THER/PROPH/DIAG IV INF INIT: CPT

## 2023-08-08 PROCEDURE — 700111 HCHG RX REV CODE 636 W/ 250 OVERRIDE (IP): Mod: JZ

## 2023-08-08 PROCEDURE — 99253 IP/OBS CNSLTJ NEW/EST LOW 45: CPT | Performed by: OBSTETRICS & GYNECOLOGY

## 2023-08-08 PROCEDURE — 96376 TX/PRO/DX INJ SAME DRUG ADON: CPT

## 2023-08-08 PROCEDURE — 86900 BLOOD TYPING SEROLOGIC ABO: CPT

## 2023-08-08 PROCEDURE — 85046 RETICYTE/HGB CONCENTRATE: CPT

## 2023-08-08 PROCEDURE — G0378 HOSPITAL OBSERVATION PER HR: HCPCS

## 2023-08-08 PROCEDURE — 700111 HCHG RX REV CODE 636 W/ 250 OVERRIDE (IP): Performed by: STUDENT IN AN ORGANIZED HEALTH CARE EDUCATION/TRAINING PROGRAM

## 2023-08-08 PROCEDURE — 700105 HCHG RX REV CODE 258

## 2023-08-08 PROCEDURE — 96367 TX/PROPH/DG ADDL SEQ IV INF: CPT

## 2023-08-08 PROCEDURE — 700102 HCHG RX REV CODE 250 W/ 637 OVERRIDE(OP): Performed by: STUDENT IN AN ORGANIZED HEALTH CARE EDUCATION/TRAINING PROGRAM

## 2023-08-08 PROCEDURE — 96375 TX/PRO/DX INJ NEW DRUG ADDON: CPT

## 2023-08-08 PROCEDURE — 85018 HEMOGLOBIN: CPT | Mod: 91

## 2023-08-08 PROCEDURE — 86850 RBC ANTIBODY SCREEN: CPT

## 2023-08-08 PROCEDURE — A9270 NON-COVERED ITEM OR SERVICE: HCPCS

## 2023-08-08 PROCEDURE — 85014 HEMATOCRIT: CPT | Mod: 91

## 2023-08-08 PROCEDURE — 83540 ASSAY OF IRON: CPT

## 2023-08-08 PROCEDURE — 82728 ASSAY OF FERRITIN: CPT

## 2023-08-08 PROCEDURE — 36415 COLL VENOUS BLD VENIPUNCTURE: CPT

## 2023-08-08 PROCEDURE — 84481 FREE ASSAY (FT-3): CPT

## 2023-08-08 PROCEDURE — A9270 NON-COVERED ITEM OR SERVICE: HCPCS | Performed by: STUDENT IN AN ORGANIZED HEALTH CARE EDUCATION/TRAINING PROGRAM

## 2023-08-08 PROCEDURE — 93005 ELECTROCARDIOGRAM TRACING: CPT

## 2023-08-08 PROCEDURE — 86901 BLOOD TYPING SEROLOGIC RH(D): CPT

## 2023-08-08 PROCEDURE — 83550 IRON BINDING TEST: CPT

## 2023-08-08 PROCEDURE — 99233 SBSQ HOSP IP/OBS HIGH 50: CPT | Mod: FS | Performed by: INTERNAL MEDICINE

## 2023-08-08 PROCEDURE — 700102 HCHG RX REV CODE 250 W/ 637 OVERRIDE(OP)

## 2023-08-08 RX ORDER — LEVOTHYROXINE SODIUM 0.03 MG/1
25 TABLET ORAL
Status: DISCONTINUED | OUTPATIENT
Start: 2023-08-08 | End: 2023-08-08

## 2023-08-08 RX ORDER — AMOXICILLIN 250 MG
2 CAPSULE ORAL 2 TIMES DAILY
Status: DISCONTINUED | OUTPATIENT
Start: 2023-08-08 | End: 2023-08-09 | Stop reason: HOSPADM

## 2023-08-08 RX ORDER — ONDANSETRON 2 MG/ML
4 INJECTION INTRAMUSCULAR; INTRAVENOUS EVERY 4 HOURS PRN
Status: DISCONTINUED | OUTPATIENT
Start: 2023-08-08 | End: 2023-08-09 | Stop reason: HOSPADM

## 2023-08-08 RX ORDER — LEVOTHYROXINE SODIUM 0.03 MG/1
25 TABLET ORAL
Status: DISCONTINUED | OUTPATIENT
Start: 2023-08-09 | End: 2023-08-09 | Stop reason: HOSPADM

## 2023-08-08 RX ORDER — POLYETHYLENE GLYCOL 3350 17 G/17G
1 POWDER, FOR SOLUTION ORAL
Status: DISCONTINUED | OUTPATIENT
Start: 2023-08-08 | End: 2023-08-09 | Stop reason: HOSPADM

## 2023-08-08 RX ORDER — LEVOTHYROXINE SODIUM 0.05 MG/1
50 TABLET ORAL
Status: DISCONTINUED | OUTPATIENT
Start: 2023-08-09 | End: 2023-08-08

## 2023-08-08 RX ORDER — BISACODYL 10 MG
10 SUPPOSITORY, RECTAL RECTAL
Status: DISCONTINUED | OUTPATIENT
Start: 2023-08-08 | End: 2023-08-09 | Stop reason: HOSPADM

## 2023-08-08 RX ORDER — ACETAMINOPHEN 500 MG
1000 TABLET ORAL EVERY 6 HOURS PRN
Status: DISCONTINUED | OUTPATIENT
Start: 2023-08-08 | End: 2023-08-09 | Stop reason: HOSPADM

## 2023-08-08 RX ADMIN — CEFTRIAXONE SODIUM 1000 MG: 10 INJECTION, POWDER, FOR SOLUTION INTRAVENOUS at 06:15

## 2023-08-08 RX ADMIN — Medication 5 MG: at 01:08

## 2023-08-08 RX ADMIN — SENNOSIDES AND DOCUSATE SODIUM 2 TABLET: 50; 8.6 TABLET ORAL at 18:00

## 2023-08-08 RX ADMIN — SODIUM CHLORIDE 250 MG: 9 INJECTION, SOLUTION INTRAVENOUS at 11:48

## 2023-08-08 RX ADMIN — Medication 5 MG: at 20:47

## 2023-08-08 RX ADMIN — ONDANSETRON 4 MG: 2 INJECTION INTRAMUSCULAR; INTRAVENOUS at 20:46

## 2023-08-08 RX ADMIN — SODIUM CHLORIDE 250 MG: 9 INJECTION, SOLUTION INTRAVENOUS at 17:15

## 2023-08-08 ASSESSMENT — ENCOUNTER SYMPTOMS
BRUISES/BLEEDS EASILY: 0
FEVER: 0
WEAKNESS: 1
NECK PAIN: 0
HEADACHES: 0
CHILLS: 1
NAUSEA: 0
DIZZINESS: 0
DOUBLE VISION: 0
DEPRESSION: 0
DIAPHORESIS: 0
PALPITATIONS: 1
HEARTBURN: 0
MYALGIAS: 0
COUGH: 0
HEMOPTYSIS: 0
BLURRED VISION: 0

## 2023-08-08 ASSESSMENT — LIFESTYLE VARIABLES
ON A TYPICAL DAY WHEN YOU DRINK ALCOHOL HOW MANY DRINKS DO YOU HAVE: 0
TOTAL SCORE: 0
EVER HAD A DRINK FIRST THING IN THE MORNING TO STEADY YOUR NERVES TO GET RID OF A HANGOVER: NO
TOTAL SCORE: 0
CONSUMPTION TOTAL: NEGATIVE
AVERAGE NUMBER OF DAYS PER WEEK YOU HAVE A DRINK CONTAINING ALCOHOL: 0
TOTAL SCORE: 0
HAVE PEOPLE ANNOYED YOU BY CRITICIZING YOUR DRINKING: NO
ALCOHOL_USE: NO
HAVE YOU EVER FELT YOU SHOULD CUT DOWN ON YOUR DRINKING: NO
HOW MANY TIMES IN THE PAST YEAR HAVE YOU HAD 5 OR MORE DRINKS IN A DAY: 0
EVER FELT BAD OR GUILTY ABOUT YOUR DRINKING: NO

## 2023-08-08 ASSESSMENT — COGNITIVE AND FUNCTIONAL STATUS - GENERAL
SUGGESTED CMS G CODE MODIFIER DAILY ACTIVITY: CH
MOBILITY SCORE: 24
DAILY ACTIVITIY SCORE: 24
SUGGESTED CMS G CODE MODIFIER MOBILITY: CH

## 2023-08-08 ASSESSMENT — FIBROSIS 4 INDEX
FIB4 SCORE: 0.9
FIB4 SCORE: 0.9

## 2023-08-08 NOTE — ED NOTES
Rounded on pt. Pt resting in bed. Pt reports headache, hospitalist notified. No further needs at this time.

## 2023-08-08 NOTE — ASSESSMENT & PLAN NOTE
In setting of hypothyroidism.  Not on contraception, s/p tubal ligation 27 yrs ago.  Normally heavy flow but regular, 2-3 days per month. As of 4/2023 now 5-6 days heavy every 2 weeks.   No abd tenderness / cramps today  Consulted Dr. Huitron w/ gyn

## 2023-08-08 NOTE — ASSESSMENT & PLAN NOTE
In setting of low T4  Troponin <6, EKG sinus breezy, CXR no acute findings  Echo pending  Added levothyroxine  Continue tele.

## 2023-08-08 NOTE — CONSULTS
Chief Complaint   Patient presents with    Palpitations     The pt reports weakness, dizziness, and heart palpitations for the lat 3 days. Palpitations are intermittent but all other symptoms are constant. The pt denies pain.     Dizziness   Requesting provider: Kelvin Maharaj NP    History of present illness:   48 y.o.  2 para 2-0-0-2, presents with lightheadedness, dizziness and weakness for the last few days.  Patient reports that her cycles have become irregular over the last couple months or so.  She has had 2 cycles every month for the past 2 months or so.  Also reports that she went a few months before hand without having any vaginal bleeding.  Currently has no vaginal bleeding.  She reports her cycles are lasting anywhere up to 1 week or so with passage of large clots and bleeding.  Patient is [employer] and is therefore bloodless  Has not been seen by a gynecologist with this nor has had any type of imaging.    ROS: Pertinent positives documented in HPI and all other systems reviewed & are negative    POBHx:  2 para 2-0-0-2.   x 2    PGYNHx: As above    All PMH, PSH, meds, allergies, social history and FH reviewed and updated today:  Past Medical History:   Diagnosis Date    Muscle disorder     c1, c2, nerve pain       History reviewed. No pertinent surgical history.    Allergies:   Allergies   Allergen Reactions    Latex Anaphylaxis and Hives    Morphine Itching       Social History     Socioeconomic History    Marital status: Legally      Spouse name: Not on file    Number of children: Not on file    Years of education: Not on file    Highest education level: Not on file   Occupational History    Not on file   Tobacco Use    Smoking status: Former     Years: 17.00     Types: Cigarettes     Quit date: 2012     Years since quittin.0    Smokeless tobacco: Not on file   Substance and Sexual Activity    Alcohol use: Yes     Alcohol/week: 0.6 oz     Types: 1  "Cans of beer per week     Comment: RARE    Drug use: No    Sexual activity: Not on file   Other Topics Concern    Not on file   Social History Narrative    Not on file     Social Determinants of Health     Financial Resource Strain: Not on file   Food Insecurity: Not on file   Transportation Needs: Not on file   Physical Activity: Not on file   Stress: Not on file   Social Connections: Not on file   Intimate Partner Violence: Not on file   Housing Stability: Not on file       No family history on file.    Physical exam:  /73   Pulse (!) 48   Temp 35.9 °C (96.7 °F) (Temporal)   Resp 15   Ht 1.676 m (5' 6\")   Wt 96.3 kg (212 lb 4.9 oz)   SpO2 98%     GENERAL APPEARANCE: healthy, alert, no distress  ABDOMEN Abdomen soft, non-tender. BS normal. No masses,  No organomegaly  FEMALE GYN: n deferred at this time.  EXTREMITIES:negative clubbing, cyanosis, edema    NEURO Awake, alert and oriented x 3, Normal gait, no sensory deficits  SKIN No rashes, or ulcers or lesions seen  PSYCHIATRIC: Patient shows appropriate affect, is alert and oriented x3, intact judgment and insight.      Assessment:  1. Heart palpitations        2. Sinus bradycardia        3. Dizziness        4. Acute urinary tract infection        5. Symptomatic anemia            Plan:   1.  Menorrhagia.  Hemoglobin stable at 8.9 at this time.  Patient is bloodless and does not agree to transfusions.    Will begin evaluation at this time with a pelvic ultrasound to assess for any anatomical abnormalities.    Begin iron supplementation    Also discussed with the patient options for hormonal management in the form of a Mirena IUD, birth control pills, NuvaRing, patch, Depo-Provera, norethindrone acetate.    Nonhormonal options also discussed the patient in the form of tranexamic acid and endometrial ablation.    Briefly touched on definitive therapy in the form of a hysterectomy as well.    Plan of care to follow once ultrasound is done.    If patient " begins bleeding again, please notify Gynecological service he as soon as possible, so that medications can be started to prevent any further blood loss given that she does not accept transfusions.    Questions answered

## 2023-08-08 NOTE — ASSESSMENT & PLAN NOTE
Rocephin course started on admit  F/u cultures   No dysuria or leukocytosis, will re-evaluate antibiotics in AM

## 2023-08-08 NOTE — PROGRESS NOTES
[facility] Medicine Daily Progress Note    Date of Service  8/8/2023    Chief Complaint  Jose Galvan is a 48 y.o. female admitted 8/7/2023 with fatigue and palpitations    Hospital Course  Jose Galvan is a 48 y.o. female who presented 8/7/2023 with feeling fatigued, palpitations, and constipation for the last few days.     She denies any chest pain or shortness of breath. She does report having chills. No other relieving or exacerbating factors were noted. She does note she has had abnormally heavy and frequent menses compared to baseline - in April 2023 increased from 2-3 days every 4 weeks to 5-6 days every 2 weeks. Denies associated abd tenderness or cramping.     In the ER, she was noted to have sinus bradycardia, UA borderline. WBC 5.6, Hgb low 9.3, troponin <6, TSH 2.09, T4 low 0.91, Bhcg negative. EKG sinus bradycardia without notable block/conduction delay. She was given Rocephin. Started on levothyroxine. She will be admitted to telemetry floor for further workup.     Interval Problem Update  8/8/2023: Patient admitted earlier today, seeing on ED rounding service.  Hb trending up, added IV iron.  Starting levothyroxine this AM  Consulted Dr. Huitron with gynecology - adding US pelvis. She is not currently bleeding and does NOT want blood transfusions, therefore Dr. Huitron does request that we notify him immediately if she does have bleeding for early intervention.   Continue telemetry    I have discussed this patient's plan of care and discharge plan at IDT rounds today with Case Management, Nursing, Nursing leadership, and other members of the IDT team.    Consultants/Specialty  gynecology    Code Status  Full Code    Disposition  The patient is not medically cleared for discharge to home or a post-acute facility.  Anticipate discharge to: home with close outpatient follow-up    I have placed the appropriate orders for post-discharge needs.    Review of Systems  Review of Systems    Constitutional:  Positive for malaise/fatigue. Negative for diaphoresis and fever.   HENT:  Negative for hearing loss and tinnitus.    Eyes:  Negative for blurred vision and double vision.   Respiratory:  Negative for cough and hemoptysis.    Cardiovascular:  Positive for palpitations. Negative for chest pain.   Gastrointestinal:  Negative for heartburn and nausea.   Genitourinary:  Negative for dysuria and urgency.   Musculoskeletal:  Negative for myalgias and neck pain.   Neurological:  Positive for weakness. Negative for dizziness and headaches.   Endo/Heme/Allergies:  Does not bruise/bleed easily.   Psychiatric/Behavioral:  Negative for depression and suicidal ideas.         Physical Exam  Temp:  [35.9 °C (96.7 °F)-36.8 °C (98.2 °F)] 35.9 °C (96.7 °F)  Pulse:  [47-68] 48  Resp:  [11-21] 15  BP: (111-149)/(61-88) 125/73  SpO2:  [90 %-100 %] 98 %    Physical Exam  Vitals and nursing note reviewed.   Constitutional:       General: She is not in acute distress.     Appearance: Normal appearance. She is not toxic-appearing.   HENT:      Head: Normocephalic.      Nose: Nose normal.      Mouth/Throat:      Mouth: Mucous membranes are moist.      Pharynx: Oropharynx is clear.   Eyes:      Extraocular Movements: Extraocular movements intact.      Pupils: Pupils are equal, round, and reactive to light.   Cardiovascular:      Rate and Rhythm: Regular rhythm. Bradycardia present.      Pulses: Normal pulses.      Heart sounds: Normal heart sounds.   Pulmonary:      Effort: Pulmonary effort is normal.      Breath sounds: Normal breath sounds.   Abdominal:      General: Abdomen is flat. Bowel sounds are normal. There is no distension.      Palpations: Abdomen is soft.      Tenderness: There is no abdominal tenderness.   Genitourinary:     Vagina: No vaginal discharge.   Musculoskeletal:         General: Normal range of motion.      Cervical back: Normal range of motion and neck supple.   Skin:     General: Skin is warm.       Capillary Refill: Capillary refill takes less than 2 seconds.      Coloration: Skin is pale.   Neurological:      General: No focal deficit present.      Mental Status: She is alert and oriented to person, place, and time. Mental status is at baseline.   Psychiatric:         Mood and Affect: Mood normal.         Behavior: Behavior normal.         Fluids  No intake or output data in the 24 hours ending 08/08/23 1432    Laboratory  Recent Labs     08/07/23  1835 08/08/23  0423 08/08/23  1012   WBC 5.6  --   --    RBC 4.42  --   --    HEMOGLOBIN 9.3* 8.5* 8.9*   HEMATOCRIT 31.8* 29.1* 30.6*   MCV 71.9*  --   --    MCH 21.0*  --   --    MCHC 29.2*  --   --    RDW 43.8  --   --    PLATELETCT 290  --   --    MPV 11.0  --   --      Recent Labs     08/07/23  1835   SODIUM 139   POTASSIUM 4.0   CHLORIDE 106   CO2 26   GLUCOSE 86   BUN 15   CREATININE 0.65   CALCIUM 8.5     Imaging  DX-CHEST-PORTABLE (1 VIEW)   Final Result      No acute cardiac or pulmonary abnormalities are identified.      EC-ECHOCARDIOGRAM COMPLETE W/O CONT    (Results Pending)   US-PELVIC TRANSVAGINAL ONLY    (Results Pending)        Assessment/Plan  * UTI (urinary tract infection)- (present on admission)  Assessment & Plan  Rocephin course started on admit  F/u cultures   No dysuria or leukocytosis, will re-evaluate antibiotics in AM    Bradycardia  Assessment & Plan  In setting of low T4  Troponin <6, EKG sinus breezy, CXR no acute findings  Echo pending  Added levothyroxine  Continue tele.     Obesity (BMI 30-39.9)  Assessment & Plan  BMI 33.77  S/p gastric sleeve approx 2 yrs ago, has lost >100 lbs    Heavy menstrual bleeding  Assessment & Plan  In setting of hypothyroidism.  Not on contraception, s/p tubal ligation 27 yrs ago.  Normally heavy flow but regular, 2-3 days per month. As of 4/2023 now 5-6 days heavy every 2 weeks.   No abd tenderness / cramps today  Consulted Dr. Huitron w/ gyn    Acute blood loss anemia  Assessment & Plan  In setting of  increased menstrual flow, intermenstrual bleeding, low iron sat and ferritin    Serial H/H. 9.3 -> 8.5 (after 1L bolus) -> 8.9  She would like to avoid transfusion if not absolutely necessary  Consult pharm to add IV iron    Hypothyroidism- (present on admission)  Assessment & Plan  Fatigue, constipation, bradycardia, heavy menses  TSH 2.09, T4 0.91. Add on 8am cortisol.  Na 139, temp 98.2 F, alert and oriented  No known prior thyroid history  Started levothyroxine 25mcg on 8/8   Recheck outpatient in 6-8 weeks          VTE prophylaxis:   SCDs/TEDs      I have performed a physical exam and reviewed and updated ROS and Plan today (8/8/2023). In review of yesterday's note (8/7/2023), there are no changes except as documented above.    Time spent with patient, 15 minutes

## 2023-08-08 NOTE — PROGRESS NOTES
4 Eyes Skin Assessment Completed by Roxana RN and ELLEN Moy.    Head WDL  Ears WDL  Nose WDL  Mouth WDL  Neck WDL  Breast/Chest WDL  Shoulder Blades WDL  Spine WDL  (R) Arm/Elbow/Hand WDL  (L) Arm/Elbow/Hand WDL  Abdomen WDL Stretch marks  Groin WDL  Scrotum/Coccyx/Buttocks WDL  (R) Leg WDL  (L) Leg WDL  (R) Heel/Foot/Toe WDL  (L) Heel/Foot/Toe WDL          Devices In Places Tele Box      Interventions In Place Pillows    Possible Skin Injury No    Pictures Uploaded Into Epic N/A  Wound Consult Placed N/A  RN Wound Prevention Protocol Ordered No

## 2023-08-08 NOTE — ED PROVIDER NOTES
"ER Provider Note    Scribed for Shell Roman D.o. by Emily Wright. 2023  7:04 PM    Primary Care Provider: None    CHIEF COMPLAINT  Chief Complaint   Patient presents with    Palpitations     The pt reports weakness, dizziness, and heart palpitations for the lat 3 days. Palpitations are intermittent but all other symptoms are constant. The pt denies pain.     Dizziness     EXTERNAL RECORDS REVIEWED  Inpatient Notes last seen in the ED 21 for knee pain    HPI/ROS  LIMITATION TO HISTORY   Select: : None  OUTSIDE HISTORIAN(S):  none    Jose Galvan is a 48 y.o. female who presents to the ED complaining of palpitations onset the last three days. She describes this as her heart doing \"flip flops\". Reports associated fatigue, dizziness, nausea, fever, chills. Also has periods of constipation then diarrhea. Denies vomiting. No history of hypertension, diabetes, seizures. History inguinal, hernia repair, knee replacement, , gastric sleeve. No hysterectomy, appendectomy. Unknown COVID.  Patient has been having heavy menstrual cycles along with breakthrough bleeding mid month as well.  She denies any melena or hematemesis.    PAST MEDICAL HISTORY  Past Medical History:   Diagnosis Date    Muscle disorder     c1, c2, nerve pain     SURGICAL HISTORY  History reviewed. No pertinent surgical history.    FAMILY HISTORY  none    SOCIAL HISTORY   reports that she quit smoking about 11 years ago. Her smoking use included cigarettes. She does not have any smokeless tobacco history on file. She reports current alcohol use of about 0.6 oz of alcohol per week. She reports that she does not use drugs.    CURRENT MEDICATIONS  none    ALLERGIES  Morphine    PHYSICAL EXAM  /76   Pulse (!) 56   Temp 36.8 °C (98.2 °F) (Temporal)   Resp 18   Ht 1.676 m (5' 6\")   Wt 94.9 kg (209 lb 3.5 oz)   SpO2 99%   BMI 33.77 kg/m²     Constitutional: Patient is well developed, well nourished. Non-toxic appearing. " Mild distress.   HENT: Normocephalic,  Nose normal with no mucosal edema or drainage. Dry oral mucus membranes.  Eyes: PERRL, EOMI, Conjunctiva without erythema or exudates.   Neck: Supple   Lymphatic: No lymphadenopathy noted.   Cardiovascular: Normal heart rate and Regular rhythm. No murmur, gallop or friction rub. Good heart tones.  Thorax & Lungs: Clear and equal breath sounds with good excursion. No respiratory distress, no rhonchi, wheezing or rales. Abdomen: Obese, Bowel sounds normal in all four quadrants. Soft,nontender, no rebound , guarding, palpable masses.   Skin: Warm, Dry, No erythema, No rashes.   Back: No cervical, thoracic, or lumbosacral tenderness. No CVA tenderness.   Extremities: Peripheral pulses 4/4 No edema, No tenderness  Musculoskeletal: Normal range of motion in all major joints. No tenderness to palpation or major deformities noted.   Neurologic: Alert & oriented x 3, Normal motor function, Normal sensory function, No lateralizing or focal deficits noted. DTR's 4/4 bilaterally.  Psychiatric: Affect normal, Judgment normal, Mood normal.     DIAGNOSTIC STUDIES  Labs:   Results for orders placed or performed during the hospital encounter of 08/07/23   CBC with Differential   Result Value Ref Range    WBC 5.6 4.8 - 10.8 K/uL    RBC 4.42 4.20 - 5.40 M/uL    Hemoglobin 9.3 (L) 12.0 - 16.0 g/dL    Hematocrit 31.8 (L) 37.0 - 47.0 %    MCV 71.9 (L) 81.4 - 97.8 fL    MCH 21.0 (L) 27.0 - 33.0 pg    MCHC 29.2 (L) 32.2 - 35.5 g/dL    RDW 43.8 35.9 - 50.0 fL    Platelet Count 290 164 - 446 K/uL    MPV 11.0 9.0 - 12.9 fL    Neutrophils-Polys 48.00 44.00 - 72.00 %    Lymphocytes 38.90 22.00 - 41.00 %    Monocytes 9.50 0.00 - 13.40 %    Eosinophils 2.50 0.00 - 6.90 %    Basophils 0.90 0.00 - 1.80 %    Immature Granulocytes 0.20 0.00 - 0.90 %    Nucleated RBC 0.00 0.00 - 0.20 /100 WBC    Neutrophils (Absolute) 2.66 1.82 - 7.42 K/uL    Lymphs (Absolute) 2.16 1.00 - 4.80 K/uL    Monos (Absolute) 0.53 0.00 -  0.85 K/uL    Eos (Absolute) 0.14 0.00 - 0.51 K/uL    Baso (Absolute) 0.05 0.00 - 0.12 K/uL    Immature Granulocytes (abs) 0.01 0.00 - 0.11 K/uL    NRBC (Absolute) 0.00 K/uL    Hypochromia 1+     Anisocytosis 1+     Microcytosis 1+    Comp Metabolic Panel   Result Value Ref Range    Sodium 139 135 - 145 mmol/L    Potassium 4.0 3.6 - 5.5 mmol/L    Chloride 106 96 - 112 mmol/L    Co2 26 20 - 33 mmol/L    Anion Gap 7.0 7.0 - 16.0    Glucose 86 65 - 99 mg/dL    Bun 15 8 - 22 mg/dL    Creatinine 0.65 0.50 - 1.40 mg/dL    Calcium 8.5 8.5 - 10.5 mg/dL    Correct Calcium 8.6 8.5 - 10.5 mg/dL    AST(SGOT) 16 12 - 45 U/L    ALT(SGPT) 9 2 - 50 U/L    Alkaline Phosphatase 67 30 - 99 U/L    Total Bilirubin 0.2 0.1 - 1.5 mg/dL    Albumin 3.9 3.2 - 4.9 g/dL    Total Protein 6.4 6.0 - 8.2 g/dL    Globulin 2.5 1.9 - 3.5 g/dL    A-G Ratio 1.6 g/dL   URINALYSIS (UA)    Specimen: Urine   Result Value Ref Range    Color Yellow     Character Clear     Specific Gravity 1.026 <1.035    Ph 5.5 5.0 - 8.0    Glucose Negative Negative mg/dL    Ketones Trace (A) Negative mg/dL    Protein Negative Negative mg/dL    Bilirubin Negative Negative    Urobilinogen, Urine 0.2 Negative    Nitrite Negative Negative    Leukocyte Esterase Small (A) Negative    Occult Blood Negative Negative    Micro Urine Req Microscopic    FREE THYROXINE   Result Value Ref Range    Free T-4 0.91 (L) 0.93 - 1.70 ng/dL   TSH   Result Value Ref Range    TSH 2.090 0.380 - 5.330 uIU/mL   TROPONIN   Result Value Ref Range    Troponin T <6 6 - 19 ng/L   ESTIMATED GFR   Result Value Ref Range    GFR (CKD-EPI) 108 >60 mL/min/1.73 m 2   PLATELET ESTIMATE   Result Value Ref Range    Plt Estimation Normal    MORPHOLOGY   Result Value Ref Range    RBC Morphology Present     Poikilocytosis 1+     Ovalocytes 1+     Acanthocytes 1+    PERIPHERAL SMEAR REVIEW   Result Value Ref Range    Peripheral Smear Review see below    DIFFERENTIAL COMMENT   Result Value Ref Range    Comments-Diff see  below    URINE MICROSCOPIC (W/UA)   Result Value Ref Range    WBC 20-50 (A) /hpf    RBC 0-2 /hpf    Bacteria Few (A) None /hpf    Epithelial Cells Few /hpf    Mucous Threads Moderate /hpf    Hyaline Cast 3-5 (A) /lpf   CoV-2, Flu A/B, And RSV by PCR ([facility])    Specimen: Nasopharyngeal; Respirate   Result Value Ref Range    Influenza virus A RNA Negative Negative    Influenza virus B, PCR Negative Negative    RSV, PCR Negative Negative    SARS-CoV-2 by PCR NotDetected     SARS-CoV-2 Source NP Swab    HCG QUANTITATIVE   Result Value Ref Range    Bhcg <1.0 0.0 - 5.0 mIU/mL   MAGNESIUM   Result Value Ref Range    Magnesium 1.9 1.5 - 2.5 mg/dL   EKG (NOW)   Result Value Ref Range    Report       [facility] Emergency Dept.    Test Date:  2023  Pt Name:    JENNIFER OAKLEY               Department: ER  MRN:        [id number]                      Room:  Gender:     Female                       Technician: [id number]  :        1975                   Requested By:ER TRIAGE PROTOCOL  Order #:    [id number]                    Reading MD:    Measurements  Intervals                                Axis  Rate:       50                           P:          6  AR:         161                          QRS:        3  QRSD:       110                          T:          3  QT:         422  QTc:        385    Interpretive Statements  Sinus bradycardia  Low voltage, precordial leads  Compared to ECG 2016 02:50:19  Low QRS voltage now present  Sinus rhythm no longer present  Intraventricular conduction delay no longer present        EKG:   Results for orders placed or performed during the hospital encounter of 23   EKG (NOW)   Result Value Ref Range    Report       [facility] Emergency Dept.    Test Date:  2023  Pt Name:    JENNIFER OAKLEY               Department: ER  MRN:        [id number]                      Room:  Gender:     Female                       Technician:  [id number]  :        [id number]                   Requested By:ER TRIAGE PROTOCOL  Order #:    [id number]                    Reading MD:    Measurements  Intervals                                Axis  Rate:       50                           P:          6  NV:         161                          QRS:        3  QRSD:       110                          T:          3  QT:         422  QTc:        385    Interpretive Statements  Sinus bradycardia  Low voltage, precordial leads  Compared to ECG 2016 02:50:19  Low QRS voltage now present  Sinus rhythm no longer present  Intraventricular conduction delay no longer present     I have independently interpreted this EKG    Radiology:   The attending emergency physician has independently interpreted the diagnostic imaging associated with this visit and am waiting the final reading from the radiologist.   Preliminary interpretation is a follows: No infiltrates or signs of heart failure.  Radiologist interpretation:   DX-CHEST-PORTABLE (1 VIEW)   Final Result      No acute cardiac or pulmonary abnormalities are identified.      EC-ECHOCARDIOGRAM COMPLETE W/O CONT    (Results Pending)     COURSE & MEDICAL DECISION MAKING   ED Observation Status? Yes; I am placing the patient in to an observation status due to a diagnostic uncertainty as well as therapeutic intensity. Patient placed in observation status at 7:19 PM, 2023.     Observation plan is as follows: Laboratories, x-ray, cardiac monitor.    Upon Reevaluation, the patient's condition has: not improved; and will be escalated to hospitalization.    Patient discharged from ED Observation status at 2023 8:59 PM    INITIAL ASSESSMENT, COURSE AND PLAN  Care Narrative: Patient was seen and evaluated, laboratories were drawn and revealed a hemoglobin of 9.3 with hematocrit of 31.8.  She also has low MCHC MCH and MCV.  It appears that she has an iron deficiency anemia.  Her urinalysis came back positive for 20-50 white  cells, moderate mucus few bacteria.  She was treated with IV Rocephin for this,  viral swab was all negative.  Chest x-ray is unremarkable twelve-lead EKG shows sinus bradycardia at a rate of 47 bpm.  She stayed in a sinus bradycardia arrhythmia and is symptomatic with it.  She describes extreme fatigue and inability to get up without feeling like she is going to pass out.  She will require hospitalization for further cardiac work-up.  She is in guarded condition.    HTN/IDDM FOLLOW UP:  The patient is referred to a primary physician for blood pressure management, diabetic screening, and for all other preventive health concerns    Differential Diagnosis includes but not limited to: viral illness, dehydration, electrolyte abnormality, thyroid issues     7:07 PM Patient seen and examined at bedside. Ordered for labs and radiology to evaluate.     9:14 PM Spoke to Dr. Chacon, hospitalist, about the patient's case. They will admit for further care and evaluation.    9:55 PM Patient reevaluated at bedside. Patient feeling improved, is resting comfortably, and is in no acute distress. Discussed resulted studies and plan for admission.     ADDITIONAL PROBLEM LIST  #palpitations    DISPOSITION AND DISCUSSIONS  I have discussed management of the patient with the following physicians and ESTELA's:  hospitalist, Dr Chacon    Discussion of management with other hospitals or appropriate source(s): None     Barriers to care at this time, including but not limited to: Patient does not have established PCP.     Decision tools and prescription drugs considered including, but not limited to: Admission for cardiac work-up, treatment of her urinary tract infection.    DISPOSITION:  Patient will be hospitalized by Dr. Chacon in guarded condition.    FINAL DIANGOSIS  1. Heart palpitations    2. Sinus bradycardia    3. Dizziness    4. Acute urinary tract infection    5. Symptomatic anemia      This dictation has been created using voice  recognition software. The accuracy of the dictation is limited by the abilities of the software. I expect there may be some errors of grammar and possibly content. I made every attempt to manually correct the errors within my dictation. However, errors related to voice recognition software may still exist and should be interpreted within the appropriate context.     Emily CONTRERAS ([place]), am scribing for, and in the presence of, Shell Roman D.O.    Electronically signed by: Emily Wrigth ([place]), 8/7/2023    IShell D.O. personally performed the services described in this documentation, as scribed by Emily Wright in my presence, and it is both accurate and complete.    The note accurately reflects work and decisions made by me.  Shell Roman D.O.  8/8/2023  2:00 AM

## 2023-08-08 NOTE — HOSPITAL COURSE
Jose Galvan is a 48 y.o. female who presented 8/7/2023 with feeling fatigued, palpitations, and constipation for the last few days.     She denies any chest pain or shortness of breath. She does report having chills. No other relieving or exacerbating factors were noted. She does note she has had abnormally heavy and frequent menses compared to baseline - in April 2023 increased from 2-3 days every 4 weeks to 5-6 days every 2 weeks. Denies associated abd tenderness or cramping.     In the ER, she was noted to have sinus bradycardia, UA borderline. WBC 5.6, Hgb low 9.3, troponin <6, TSH 2.09, T4 low 0.91, Bhcg negative. EKG sinus bradycardia without notable block/conduction delay. She was given Rocephin. Started on levothyroxine. She will be admitted to telemetry floor for further workup.

## 2023-08-08 NOTE — DISCHARGE PLANNING
Patient rolled back to observation/outpatient status per attending MD determination (Dr. Smith) and UR committee MD secondary review (Dr. Ferrara). Condition code 44 completed.

## 2023-08-08 NOTE — PROGRESS NOTES
Subjective     Jose Galvan is a 48 y.o. female who presents with Dizziness (3 days) and Constipation (3 days)    PMH:  has a past medical history of Muscle disorder.  MEDS: No current outpatient medications on file.  ALLERGIES:   Allergies   Allergen Reactions    Morphine Itching     SURGHX: No past surgical history on file.  SOCHX:  reports that she quit smoking about 11 years ago. Her smoking use included cigarettes. She does not have any smokeless tobacco history on file. She reports current alcohol use of about 0.6 oz of alcohol per week. She reports that she does not use drugs.  FH: Reviewed with patient, not pertinent to this visit.           Patient presents with complaint of intermittent palpitations that are sometimes fleeting and sometimes persistent on and off for the last 3 to 4 days as well as some dizziness, fatigue, headache, muscle aches.  Patient denies congestion, runny nose, sore throat, cough, nausea vomiting or diarrhea, dysuria.  Patient states she has been a little constipated but she does not think that this is causing her palpitations.        Of note patient had total knee replacement of her left knee 4 months ago.  Patient denies any issues with her surgery, is not on any anticoagulation medication currently.    Palpitations   This is a new problem. The current episode started in the past 7 days. The problem occurs intermittently. The problem has been waxing and waning. The symptoms are aggravated by unknown. Associated symptoms include dizziness, an irregular heartbeat and malaise/fatigue. Pertinent negatives include no anxiety, chest pain, coughing, diaphoresis, fever, nausea, near-syncope, numbness, shortness of breath or vomiting. She has tried nothing for the symptoms. Risk factors include obesity, smoking/tobacco exposure and sedentary lifestyle. total knee replacement 4 months ago       Review of Systems   Constitutional:  Positive for malaise/fatigue. Negative for diaphoresis  "and fever.   Respiratory:  Negative for cough and shortness of breath.    Cardiovascular:  Positive for palpitations. Negative for chest pain, leg swelling, PND and near-syncope.   Gastrointestinal:  Positive for constipation. Negative for diarrhea, nausea and vomiting.   Genitourinary: Negative.    Neurological:  Positive for dizziness and headaches. Negative for numbness.   Psychiatric/Behavioral:  The patient is not nervous/anxious.    All other systems reviewed and are negative.             Objective     /72 (BP Location: Right arm, Patient Position: Sitting, BP Cuff Size: Large adult)   Pulse 60   Temp 36.6 °C (97.9 °F) (Temporal)   Resp 20   Ht 1.725 m (5' 7.91\")   Wt 94.8 kg (209 lb)   SpO2 100%   BMI 31.86 kg/m²      Physical Exam  Vitals and nursing note reviewed.   Constitutional:       General: She is not in acute distress.     Appearance: Normal appearance. She is well-developed. She is obese. She is not ill-appearing or toxic-appearing.   HENT:      Head: Normocephalic and atraumatic.      Right Ear: Tympanic membrane normal.      Left Ear: Tympanic membrane normal.      Nose: Nose normal.      Mouth/Throat:      Lips: Pink.      Mouth: Mucous membranes are moist.      Pharynx: Oropharynx is clear. Uvula midline.   Eyes:      Extraocular Movements: Extraocular movements intact.      Conjunctiva/sclera: Conjunctivae normal.      Pupils: Pupils are equal, round, and reactive to light.   Cardiovascular:      Rate and Rhythm: Normal rate and regular rhythm.      Pulses: Normal pulses.      Heart sounds: Normal heart sounds.   Pulmonary:      Effort: Pulmonary effort is normal.      Breath sounds: Normal breath sounds.   Abdominal:      General: Bowel sounds are normal.      Palpations: Abdomen is soft.   Musculoskeletal:         General: Normal range of motion.      Cervical back: Normal range of motion and neck supple.   Skin:     General: Skin is warm and dry.      Capillary Refill: Capillary " refill takes less than 2 seconds.   Neurological:      General: No focal deficit present.      Mental Status: She is alert and oriented to person, place, and time.      Cranial Nerves: No cranial nerve deficit.      Motor: Motor function is intact.      Coordination: Coordination is intact.      Gait: Gait normal.   Psychiatric:         Mood and Affect: Mood normal.                             Assessment & Plan              1. Palpitations        2. Fatigue, unspecified type        3. Dizziness          PT requires evaluation and treatment at a facility that can provide a higher level of care due to acuity of illness/complaint.     Differential diagnosis which was discussed with the patient could include but is not limited to new onset A-fib, PE (total knee replacement 4 months ago) thyroid issues, Lyme disease flare (patient has history of Lyme disease).    PT was offered EKG, declined stating she will go to the ER for evaluation as they can do any stat labs or other studies she may need.      PT will go POV to ER.      Differential diagnosis, supportive care, and indications for immediate follow-up discussed with patient.  Instructed to return to clinic or nearest emergency department for any change in condition, further concerns, or worsening of symptoms.    I personally reviewed prior external notes and test results pertinent to today's visit.  I have independently reviewed and interpreted all diagnostics ordered during this urgent care visit.    PT discharged.

## 2023-08-08 NOTE — ASSESSMENT & PLAN NOTE
In setting of increased menstrual flow, intermenstrual bleeding, low iron sat and ferritin    Serial H/H. 9.3 -> 8.5 (after 1L bolus) -> 8.9  Does NOT want blood transfusions.  Consult pharm to add IV iron

## 2023-08-08 NOTE — H&P
Hospital Medicine History & Physical Note    Date of Service  8/7/2023    Primary Care Physician  Pcp Not In Computer    Consultants  none    Specialist Names: none     Code Status  Full Code    Chief Complaint  Chief Complaint   Patient presents with    Palpitations     The pt reports weakness, dizziness, and heart palpitations for the lat 3 days. Palpitations are intermittent but all other symptoms are constant. The pt denies pain.     Dizziness       History of Presenting Illness  Jose Galvan is a 48 y.o. female who presented 8/7/2023 with feeling fatigued and palpitations for the last few days. She denies any chest pain or shortness of breath. She also reports having constipation. She does report having fevers and chills. No other relieving or exacerbating factors were noted. In the ER, she was noted to have sinus bradycardia and positive urinalysis consistent with UTI. She was given Rocephin. She will be admitted to telemetry floor for further workup.     I discussed the plan of care with patient and ER physician .    Review of Systems  Review of Systems   Constitutional:  Positive for chills and malaise/fatigue. Negative for fever.   HENT:  Negative for hearing loss and tinnitus.    Eyes:  Negative for blurred vision and double vision.   Respiratory:  Negative for cough and hemoptysis.    Cardiovascular:  Positive for palpitations. Negative for chest pain.   Gastrointestinal:  Negative for heartburn and nausea.   Genitourinary:  Negative for dysuria and urgency.   Musculoskeletal:  Negative for myalgias and neck pain.   Neurological:  Positive for weakness. Negative for dizziness and headaches.   Endo/Heme/Allergies:  Does not bruise/bleed easily.   Psychiatric/Behavioral:  Negative for depression and suicidal ideas.        Past Medical History   has a past medical history of Muscle disorder.    Surgical History   has no past surgical history on file.     Family History  family history is not on file.    Family history reviewed with patient. There is no family history that is pertinent to the chief complaint.     Social History   reports that she quit smoking about 11 years ago. Her smoking use included cigarettes. She does not have any smokeless tobacco history on file. She reports current alcohol use of about 0.6 oz of alcohol per week. She reports that she does not use drugs.    Allergies  Allergies   Allergen Reactions    Morphine Itching       Medications  Prior to Admission Medications   Prescriptions Last Dose Informant Patient Reported? Taking?   Melatonin 10 MG TABLET DISPERSIBLE 8/6/2023 at PM Patient Yes Yes   Sig: Take 20 mg by mouth at bedtime as needed (Sleep). 20mg= 2 tablets      Facility-Administered Medications: None       Physical Exam  Temp:  [36.6 °C (97.9 °F)-36.8 °C (98.2 °F)] 36.8 °C (98.2 °F)  Pulse:  [47-68] 68  Resp:  [11-21] 16  BP: (115-149)/(61-88) 133/80  SpO2:  [94 %-100 %] 94 %  Blood Pressure: 133/80   Temperature: 36.8 °C (98.2 °F)   Pulse: 68   Respiration: 16   Pulse Oximetry: 94 %       Physical Exam  Vitals and nursing note reviewed.   Constitutional:       Appearance: Normal appearance.   HENT:      Head: Normocephalic and atraumatic.      Right Ear: Tympanic membrane normal.      Left Ear: Tympanic membrane normal.      Nose: Nose normal.      Mouth/Throat:      Mouth: Mucous membranes are moist.      Pharynx: Oropharynx is clear.   Eyes:      Extraocular Movements: Extraocular movements intact.      Pupils: Pupils are equal, round, and reactive to light.   Cardiovascular:      Rate and Rhythm: Regular rhythm. Bradycardia present.      Pulses: Normal pulses.      Heart sounds: Normal heart sounds.   Pulmonary:      Effort: Pulmonary effort is normal.      Breath sounds: Normal breath sounds.   Abdominal:      General: Bowel sounds are normal. There is no distension.      Palpations: Abdomen is soft. There is no mass.   Musculoskeletal:         General: No swelling or  tenderness. Normal range of motion.      Cervical back: Neck supple.   Skin:     General: Skin is warm.      Capillary Refill: Capillary refill takes less than 2 seconds.      Coloration: Skin is not jaundiced or pale.   Neurological:      General: No focal deficit present.      Mental Status: She is alert and oriented to person, place, and time. Mental status is at baseline.   Psychiatric:         Mood and Affect: Mood normal.         Behavior: Behavior normal.         Laboratory:  Recent Labs     08/07/23  1835   WBC 5.6   RBC 4.42   HEMOGLOBIN 9.3*   HEMATOCRIT 31.8*   MCV 71.9*   MCH 21.0*   MCHC 29.2*   RDW 43.8   PLATELETCT 290   MPV 11.0     Recent Labs     08/07/23  1835   SODIUM 139   POTASSIUM 4.0   CHLORIDE 106   CO2 26   GLUCOSE 86   BUN 15   CREATININE 0.65   CALCIUM 8.5     Recent Labs     08/07/23  1835   ALTSGPT 9   ASTSGOT 16   ALKPHOSPHAT 67   TBILIRUBIN 0.2   GLUCOSE 86         No results for input(s): NTPROBNP in the last 72 hours.      Recent Labs     08/07/23  1835   TROPONINT <6       Imaging:  DX-CHEST-PORTABLE (1 VIEW)   Final Result      No acute cardiac or pulmonary abnormalities are identified.      EC-ECHOCARDIOGRAM COMPLETE W/O CONT    (Results Pending)       X-Ray:  I have personally reviewed the images and compared with prior images.    Assessment/Plan:  Justification for Admission Status  I anticipate this patient will require at least two midnights for appropriate medical management, necessitating inpatient admission because acute blood loss anemia, UTI requiring IV abx.    Patient will need a Telemetry bed on MEDICAL service .  The need is secondary to see above.    * UTI (urinary tract infection)- (present on admission)  Assessment & Plan  Rocephin q24 hourly   F/u cultures     Obesity (BMI 30-39.9)  Assessment & Plan  BMI 33.77    Heavy menstrual bleeding  Assessment & Plan  Gyn consult in am   Transfuse < 8     Acute blood loss anemia  Assessment & Plan  From heavy  menses    Serial H/H  Transfuse < 8   COD   Hold all Nsaid's and anticoagulants       Hypothyroidism- (present on admission)  Assessment & Plan  From chart review. Possibility given symptoms of fatigue, constipation and bradycardia likely has underlying thyroid dysfunction.   Start on levothyroxine 25 microgram. Outpatient rechecking in 6-8 weeks             VTE prophylaxis: SCDs/TEDs

## 2023-08-08 NOTE — ED NOTES
Assumed care of patient, bedside report received from off coming RN. Introduced self as pt RN, POC discussed, call light in reach. No further needs at this time.

## 2023-08-08 NOTE — ED NOTES
Patient ambulatory to room, changed into a gown, chart up for erp.   6 weeks pregnant not confirmed with U/S LMP 5/17 c/o cramps

## 2023-08-08 NOTE — ED TRIAGE NOTES
"Chief Complaint   Patient presents with    Palpitations     The pt reports weakness, dizziness, and heart palpitations for the lat 3 days. Palpitations are intermittent but all other symptoms are constant. The pt denies pain.     Dizziness       Pt ambulatory to triage. Pt A&Ox4, for the above complaint.     Pt to lobby . Pt educated on alerting staff in changes to condition. Pt verbalized understanding. Protocol ECG ordered.     /76   Pulse (!) 56   Temp 36.8 °C (98.2 °F) (Temporal)   Resp 18   Ht 1.676 m (5' 6\")   Wt 94.9 kg (209 lb 3.5 oz)   SpO2 99%   BMI 33.77 kg/m²     "

## 2023-08-08 NOTE — ASSESSMENT & PLAN NOTE
Fatigue, constipation, bradycardia, heavy menses  TSH 2.09, T4 0.91. Add on 8am cortisol.  Na 139, temp 98.2 F, alert and oriented  No known prior thyroid history  Started levothyroxine 25mcg on 8/8   Recheck outpatient in 6-8 weeks

## 2023-08-09 ENCOUNTER — PATIENT OUTREACH (OUTPATIENT)
Dept: SCHEDULING | Facility: IMAGING CENTER | Age: 48
End: 2023-08-09

## 2023-08-09 ENCOUNTER — APPOINTMENT (OUTPATIENT)
Dept: CARDIOLOGY | Facility: MEDICAL CENTER | Age: 48
End: 2023-08-09
Attending: STUDENT IN AN ORGANIZED HEALTH CARE EDUCATION/TRAINING PROGRAM
Payer: MEDICAID

## 2023-08-09 ENCOUNTER — PHARMACY VISIT (OUTPATIENT)
Dept: PHARMACY | Facility: MEDICAL CENTER | Age: 48
End: 2023-08-09
Payer: COMMERCIAL

## 2023-08-09 VITALS
HEIGHT: 66 IN | WEIGHT: 212.08 LBS | TEMPERATURE: 97.7 F | DIASTOLIC BLOOD PRESSURE: 69 MMHG | RESPIRATION RATE: 18 BRPM | HEART RATE: 61 BPM | SYSTOLIC BLOOD PRESSURE: 114 MMHG | OXYGEN SATURATION: 99 % | BODY MASS INDEX: 34.08 KG/M2

## 2023-08-09 LAB
ALBUMIN SERPL BCP-MCNC: 3.3 G/DL (ref 3.2–4.9)
ALBUMIN/GLOB SERPL: 1.3 G/DL
ALP SERPL-CCNC: 56 U/L (ref 30–99)
ALT SERPL-CCNC: 5 U/L (ref 2–50)
ANION GAP SERPL CALC-SCNC: 7 MMOL/L (ref 7–16)
AST SERPL-CCNC: 15 U/L (ref 12–45)
BILIRUB SERPL-MCNC: <0.2 MG/DL (ref 0.1–1.5)
BUN SERPL-MCNC: 10 MG/DL (ref 8–22)
CALCIUM ALBUM COR SERPL-MCNC: 8.9 MG/DL (ref 8.5–10.5)
CALCIUM SERPL-MCNC: 8.3 MG/DL (ref 8.5–10.5)
CHLORIDE SERPL-SCNC: 109 MMOL/L (ref 96–112)
CO2 SERPL-SCNC: 25 MMOL/L (ref 20–33)
CORTIS SERPL-MCNC: 9.3 UG/DL (ref 0–23)
CREAT SERPL-MCNC: 0.66 MG/DL (ref 0.5–1.4)
EKG IMPRESSION: NORMAL
ERYTHROCYTE [DISTWIDTH] IN BLOOD BY AUTOMATED COUNT: 43.6 FL (ref 35.9–50)
GFR SERPLBLD CREATININE-BSD FMLA CKD-EPI: 107 ML/MIN/1.73 M 2
GLOBULIN SER CALC-MCNC: 2.5 G/DL (ref 1.9–3.5)
GLUCOSE SERPL-MCNC: 88 MG/DL (ref 65–99)
HCT VFR BLD AUTO: 30.6 % (ref 37–47)
HCT VFR BLD AUTO: 31.5 % (ref 37–47)
HGB BLD-MCNC: 9.1 G/DL (ref 12–16)
HGB BLD-MCNC: 9.2 G/DL (ref 12–16)
LV EJECT FRACT  99904: 60
LV EJECT FRACT MOD 2C 99903: 53.4
LV EJECT FRACT MOD 4C 99902: 60.58
LV EJECT FRACT MOD BP 99901: 57.23
MAGNESIUM SERPL-MCNC: 1.9 MG/DL (ref 1.5–2.5)
MCH RBC QN AUTO: 21.5 PG (ref 27–33)
MCHC RBC AUTO-ENTMCNC: 30.1 G/DL (ref 32.2–35.5)
MCV RBC AUTO: 71.5 FL (ref 81.4–97.8)
PLATELET # BLD AUTO: 250 K/UL (ref 164–446)
PMV BLD AUTO: 11.2 FL (ref 9–12.9)
POTASSIUM SERPL-SCNC: 3.9 MMOL/L (ref 3.6–5.5)
PROT SERPL-MCNC: 5.8 G/DL (ref 6–8.2)
RBC # BLD AUTO: 4.28 M/UL (ref 4.2–5.4)
SODIUM SERPL-SCNC: 141 MMOL/L (ref 135–145)
WBC # BLD AUTO: 6.4 K/UL (ref 4.8–10.8)

## 2023-08-09 PROCEDURE — 700102 HCHG RX REV CODE 250 W/ 637 OVERRIDE(OP)

## 2023-08-09 PROCEDURE — 82533 TOTAL CORTISOL: CPT

## 2023-08-09 PROCEDURE — 96366 THER/PROPH/DIAG IV INF ADDON: CPT

## 2023-08-09 PROCEDURE — 99238 HOSP IP/OBS DSCHRG MGMT 30/<: CPT | Mod: GC | Performed by: INTERNAL MEDICINE

## 2023-08-09 PROCEDURE — 93010 ELECTROCARDIOGRAM REPORT: CPT | Performed by: INTERNAL MEDICINE

## 2023-08-09 PROCEDURE — 93306 TTE W/DOPPLER COMPLETE: CPT | Mod: 26 | Performed by: INTERNAL MEDICINE

## 2023-08-09 PROCEDURE — 85027 COMPLETE CBC AUTOMATED: CPT

## 2023-08-09 PROCEDURE — 700111 HCHG RX REV CODE 636 W/ 250 OVERRIDE (IP): Mod: JZ

## 2023-08-09 PROCEDURE — 85018 HEMOGLOBIN: CPT

## 2023-08-09 PROCEDURE — RXMED WILLOW AMBULATORY MEDICATION CHARGE

## 2023-08-09 PROCEDURE — 83735 ASSAY OF MAGNESIUM: CPT

## 2023-08-09 PROCEDURE — 700105 HCHG RX REV CODE 258

## 2023-08-09 PROCEDURE — A9270 NON-COVERED ITEM OR SERVICE: HCPCS

## 2023-08-09 PROCEDURE — 700111 HCHG RX REV CODE 636 W/ 250 OVERRIDE (IP): Performed by: STUDENT IN AN ORGANIZED HEALTH CARE EDUCATION/TRAINING PROGRAM

## 2023-08-09 PROCEDURE — G0378 HOSPITAL OBSERVATION PER HR: HCPCS

## 2023-08-09 PROCEDURE — 96375 TX/PRO/DX INJ NEW DRUG ADDON: CPT | Mod: XU

## 2023-08-09 PROCEDURE — 99231 SBSQ HOSP IP/OBS SF/LOW 25: CPT | Performed by: OBSTETRICS & GYNECOLOGY

## 2023-08-09 PROCEDURE — 93306 TTE W/DOPPLER COMPLETE: CPT

## 2023-08-09 PROCEDURE — 85014 HEMATOCRIT: CPT

## 2023-08-09 PROCEDURE — 80053 COMPREHEN METABOLIC PANEL: CPT

## 2023-08-09 RX ORDER — FERROUS SULFATE 325(65) MG
325 TABLET ORAL
Qty: 60 TABLET | Refills: 0 | Status: SHIPPED | OUTPATIENT
Start: 2023-08-09

## 2023-08-09 RX ORDER — LEVOTHYROXINE SODIUM 0.03 MG/1
25 TABLET ORAL
Qty: 30 TABLET | Refills: 2 | Status: SHIPPED | OUTPATIENT
Start: 2023-08-10

## 2023-08-09 RX ADMIN — SENNOSIDES AND DOCUSATE SODIUM 2 TABLET: 50; 8.6 TABLET ORAL at 05:44

## 2023-08-09 RX ADMIN — SODIUM CHLORIDE 250 MG: 9 INJECTION, SOLUTION INTRAVENOUS at 12:57

## 2023-08-09 RX ADMIN — LEVOTHYROXINE SODIUM 25 MCG: 0.03 TABLET ORAL at 05:44

## 2023-08-09 RX ADMIN — CEFTRIAXONE SODIUM 1000 MG: 10 INJECTION, POWDER, FOR SOLUTION INTRAVENOUS at 05:44

## 2023-08-09 RX ADMIN — SODIUM CHLORIDE 250 MG: 9 INJECTION, SOLUTION INTRAVENOUS at 05:50

## 2023-08-09 NOTE — DISCHARGE SUMMARY
Discharge Summary    CHIEF COMPLAINT ON ADMISSION  Chief Complaint   Patient presents with    Palpitations     The pt reports weakness, dizziness, and heart palpitations for the lat 3 days. Palpitations are intermittent but all other symptoms are constant. The pt denies pain.     Dizziness       Reason for Admission  Palpitations, cold intolerance, constipation    Admission Date  8/7/2023    Discharge Date  08/09/23      CODE STATUS  Full Code    HPI & HOSPITAL COURSE  Jose Galvan is a 48 y.o. year old female with a h/o hypothyroidism not on medication who presented with symptoms of subjective palpitation, chills, nausea, constipation, and fatigue for the past 3 days. She also reports heavy menstrual bleeding every two weeks with passing of clots for 5-6 days since April 2023. She presented to the ED with stable hemodynamics, vitals significant of bradycardia. Her labs were significant for hemoglobin of 9.3, low iron, ferritin, %saturation, a low free T-4, normal TSH. She also had a UA consistent with UTI and she was treated with one dose of ceftriaxone. Her hemoglobin was closely monitored during her hospital stay. Of note, patient is Buddhism and does not want blood transfusions. Gynecology was consulted and performed pelvic ultrasound which was remarkable for 1.4 cm uterine fundal fibroid, thickening of endometrial stripe, and multiple right ovarian cysts, recommending outpatient follow up with hysteroscopy and endometrial biopsy. She was started on IV iron supplementation and norethindrone acetate. Her symptoms of hypothyroidism was treated with levothyroxine and she will be discharged with the medication with instructions to follow up with her primary care in [place]. She was also referred to outpatient gynecology and cardiology.    Physical Exam:  Physical Exam  Constitutional:       General: She is not in acute distress.  Cardiovascular:      Rate and Rhythm: Regular rhythm. Bradycardia  present.      Heart sounds: No murmur heard.     No gallop.   Pulmonary:      Effort: Pulmonary effort is normal. No respiratory distress.      Breath sounds: No wheezing or rales.   Abdominal:      General: Abdomen is flat.      Palpations: Abdomen is soft.      Tenderness: There is no abdominal tenderness.   Skin:     General: Skin is warm.   Neurological:      Mental Status: She is alert.         Therefore, she is discharged in good and stable condition to home with close outpatient follow-up.    The patient met 2-midnight criteria for an inpatient stay at the time of discharge.        FOLLOW UP ITEMS POST DISCHARGE  Follow up with PCP for continued therapy with levothyroxine, cardiology for subjective bradycardia, and gynecology for heavy menstrual bleeding and fibroid.    DISCHARGE DIAGNOSES  Principal Problem:    UTI (urinary tract infection) (POA: Yes)  Active Problems:    Hypothyroidism (POA: Yes)    Acute blood loss anemia (POA: Unknown)    Heavy menstrual bleeding (POA: Unknown)    Obesity (BMI 30-39.9) (POA: Unknown)    Bradycardia (POA: Unknown)  Resolved Problems:    * No resolved hospital problems. *      FOLLOW UP  Gynecology, cardiology, PCP      MEDICATIONS ON DISCHARGE     Medication List        START taking these medications        Instructions   ferrous sulfate 325 (65 Fe) MG tablet   Doctor's comments: Take Monday Wednesday Friday  Take 1 Tablet by mouth 3 times a week. Take on Monday, Wednesday, and Friday.  Dose: 325 mg     levothyroxine 25 MCG Tabs  Start taking on: August 10, 2023  Commonly known as: Synthroid   Take 1 Tablet by mouth every morning on an empty stomach.  Dose: 25 mcg     norethindrone 5 MG tablet  Commonly known as: Aygestin   Take 1 Tablet by mouth every day.  Dose: 5 mg            CONTINUE taking these medications        Instructions   Melatonin 10 MG Tbdp   Take 20 mg by mouth at bedtime as needed (Sleep). 20mg= 2 tablets  Dose: 20 mg              Allergies  Allergies    Allergen Reactions    Latex Anaphylaxis and Hives    Morphine Itching       DIET  Orders Placed This Encounter   Procedures    Diet Order Diet: Regular     Standing Status:   Standing     Number of Occurrences:   1     Order Specific Question:   Diet:     Answer:   Regular [1]       ACTIVITY  As tolerated.  Weight bearing as tolerated    CONSULTATIONS  Gynecology    PROCEDURES  none    LABORATORY  Lab Results   Component Value Date    SODIUM 141 08/09/2023    POTASSIUM 3.9 08/09/2023    CHLORIDE 109 08/09/2023    CO2 25 08/09/2023    GLUCOSE 88 08/09/2023    BUN 10 08/09/2023    CREATININE 0.66 08/09/2023        Lab Results   Component Value Date    WBC 6.4 08/09/2023    HEMOGLOBIN 9.2 (L) 08/09/2023    HEMATOCRIT 30.6 (L) 08/09/2023    PLATELETCT 250 08/09/2023        Total time of the discharge process was 61 minutes.

## 2023-08-09 NOTE — PROGRESS NOTES
Chief Complaint   Patient presents with    Palpitations     The pt reports weakness, dizziness, and heart palpitations for the lat 3 days. Palpitations are intermittent but all other symptoms are constant. The pt denies pain.     Dizziness   Vaginal bleeding    History of present illness:   48 y.o. admitted secondary to vaginal bleeding and anemia.  Please see prior notes for full details.  No acute events overnight.  No vaginal bleeding.  Ultrasound showed what appeared to be a small fibroid as well as slightly thickened endometrium.  Denies any pain at this time    ROS: Pertinent positives documented in HPI and all other systems reviewed & are negative    All PMH, PSH, meds, allergies, social history and FH reviewed and updated today:  Past Medical History:   Diagnosis Date    Muscle disorder     c1, c2, nerve pain       History reviewed. No pertinent surgical history.    Allergies:   Allergies   Allergen Reactions    Latex Anaphylaxis and Hives    Morphine Itching       Social History     Socioeconomic History    Marital status: Legally      Spouse name: Not on file    Number of children: Not on file    Years of education: Not on file    Highest education level: Not on file   Occupational History    Not on file   Tobacco Use    Smoking status: Former     Years: 17.00     Types: Cigarettes     Quit date: 2012     Years since quittin.0    Smokeless tobacco: Not on file   Substance and Sexual Activity    Alcohol use: Yes     Alcohol/week: 0.6 oz     Types: 1 Cans of beer per week     Comment: RARE    Drug use: No    Sexual activity: Not on file   Other Topics Concern    Not on file   Social History Narrative    Not on file     Social Determinants of Health     Financial Resource Strain: Not on file   Food Insecurity: Not on file   Transportation Needs: Not on file   Physical Activity: Not on file   Stress: Not on file   Social Connections: Not on file   Intimate Partner Violence: Not on file  "  Housing Stability: Not on file       No family history on file.    Physical exam:  /68   Pulse (!) 54   Temp 36.1 °C (97 °F) (Temporal)   Resp 17   Ht 1.676 m (5' 6\")   Wt 96.2 kg (212 lb 1.3 oz)   SpO2 95%     GENERAL APPEARANCE: healthy, alert, no distress  ABDOMEN Abdomen soft, non-tender. BS normal. No masses,  No organomegaly  FEMALE GYN: Deferred  EXTREMITIES:negative clubbing, cyanosis, edema    NEURO Awake, alert and oriented x 3, Normal gait, no sensory deficits  SKIN No rashes, or ulcers or lesions seen  PSYCHIATRIC: Patient shows appropriate affect, is alert and oriented x3, intact judgment and insight.      Assessment:  1. Heart palpitations        2. Sinus bradycardia        3. Dizziness        4. Acute urinary tract infection        5. Symptomatic anemia            Plan:   .  Abnormal uterine bleeding.  No further bleeding overnight.  Latest hemoglobin 9.2 this morning.    Reviewed ultrasound with patient.  Endometrial lining thicker than expected for somebody who just had her cycle.  This could indicate possible polyp versus hyperplasia.  Patient will need an endometrial biopsy.  This can be arranged as outpatient.    Will likely also need hysteroscopy for evaluation of the lining of the uterine cavity.    In meantime, we will start patient on norethindrone acetate to prevent any further bleeding.  Prescription has been sent.    Will arrange for follow-up as outpatient  "

## 2023-08-09 NOTE — DISCHARGE PLANNING
CC44 Form 2 delivered to the patient at bedside.  Patient clarified that she has [place] Medicaid, but she doesn't have her insurance card with her.  RN CM notified PFA regarding insurance.

## 2023-08-09 NOTE — CARE PLAN
The patient is Stable - Low risk of patient condition declining or worsening         Progress made toward(s) clinical / shift goals:  Problem: Knowledge Deficit - Standard  Goal: Patient and family/care givers will demonstrate understanding of plan of care, disease process/condition, diagnostic tests and medications  Outcome: Progressing     Patient arrived to unit, A&Ox4. Continues to have palpitations, no chest pain, no SOB. OOB indap, steady gait. Resting in bed, denies needs, call bell within reach.

## 2023-08-09 NOTE — CARE PLAN
The patient is Stable - Low risk of patient condition declining or worsening         Progress made toward(s) clinical / shift goals:    Problem: Knowledge Deficit - Standard  Goal: Patient and family/care givers will demonstrate understanding of plan of care, disease process/condition, diagnostic tests and medications  8/9/2023 0123 by Antonietta Tubbs R.N.  Outcome: Progressing    Problem: Discharge Barriers/Planning  Goal: Patient's continuum of care needs are met  Outcome: Progressing

## 2023-08-09 NOTE — PROGRESS NOTES
Monitor Summary  Rhythm: SB/SR  HR: 51-65  Ectopy: n/a   Measurements: 0.17/0.08/0.47       12 hour chart check

## 2023-08-09 NOTE — DISCHARGE INSTRUCTIONS
Please continue to take levothyroxine and follow up with your primary care. Follow up with gynecology with regards to uterine bleeding. Follow up with cardiology for slow heart rhythm and palpitations.

## 2023-08-10 LAB
BACTERIA UR CULT: NORMAL
SIGNIFICANT IND 70042: NORMAL
SITE SITE: NORMAL
SOURCE SOURCE: NORMAL

## 2023-08-11 ENCOUNTER — TELEPHONE (OUTPATIENT)
Dept: OBGYN | Facility: CLINIC | Age: 48
End: 2023-08-11
Payer: MEDICAID